# Patient Record
Sex: MALE | Race: WHITE | NOT HISPANIC OR LATINO | URBAN - METROPOLITAN AREA
[De-identification: names, ages, dates, MRNs, and addresses within clinical notes are randomized per-mention and may not be internally consistent; named-entity substitution may affect disease eponyms.]

---

## 2023-04-26 ENCOUNTER — TELEPHONE (OUTPATIENT)
Dept: PRIMARY CARE CLINIC | Facility: CLINIC | Age: 38
End: 2023-04-26

## 2023-04-26 ENCOUNTER — OFFICE VISIT (OUTPATIENT)
Dept: PRIMARY CARE CLINIC | Facility: CLINIC | Age: 38
End: 2023-04-26
Payer: COMMERCIAL

## 2023-04-26 VITALS
HEART RATE: 67 BPM | WEIGHT: 315 LBS | BODY MASS INDEX: 44.1 KG/M2 | DIASTOLIC BLOOD PRESSURE: 80 MMHG | HEIGHT: 71 IN | SYSTOLIC BLOOD PRESSURE: 132 MMHG | TEMPERATURE: 98 F

## 2023-04-26 DIAGNOSIS — G47.33 OSA (OBSTRUCTIVE SLEEP APNEA): ICD-10-CM

## 2023-04-26 DIAGNOSIS — I10 PRIMARY HYPERTENSION: ICD-10-CM

## 2023-04-26 DIAGNOSIS — E66.01 MORBID OBESITY WITH BMI OF 45.0-49.9, ADULT: ICD-10-CM

## 2023-04-26 DIAGNOSIS — Z76.89 ENCOUNTER TO ESTABLISH CARE: Primary | ICD-10-CM

## 2023-04-26 DIAGNOSIS — R53.83 FATIGUE, UNSPECIFIED TYPE: ICD-10-CM

## 2023-04-26 DIAGNOSIS — R79.89 LOW TESTOSTERONE: ICD-10-CM

## 2023-04-26 PROCEDURE — 1159F MED LIST DOCD IN RCRD: CPT | Mod: CPTII,S$GLB,, | Performed by: FAMILY MEDICINE

## 2023-04-26 PROCEDURE — 3008F BODY MASS INDEX DOCD: CPT | Mod: CPTII,S$GLB,, | Performed by: FAMILY MEDICINE

## 2023-04-26 PROCEDURE — 3075F PR MOST RECENT SYSTOLIC BLOOD PRESS GE 130-139MM HG: ICD-10-PCS | Mod: CPTII,S$GLB,, | Performed by: FAMILY MEDICINE

## 2023-04-26 PROCEDURE — 3075F SYST BP GE 130 - 139MM HG: CPT | Mod: CPTII,S$GLB,, | Performed by: FAMILY MEDICINE

## 2023-04-26 PROCEDURE — 99215 PR OFFICE/OUTPT VISIT, EST, LEVL V, 40-54 MIN: ICD-10-PCS | Mod: S$GLB,,, | Performed by: FAMILY MEDICINE

## 2023-04-26 PROCEDURE — 99215 OFFICE O/P EST HI 40 MIN: CPT | Mod: S$GLB,,, | Performed by: FAMILY MEDICINE

## 2023-04-26 PROCEDURE — 99999 PR PBB SHADOW E&M-NEW PATIENT-LVL IV: ICD-10-PCS | Mod: PBBFAC,,, | Performed by: FAMILY MEDICINE

## 2023-04-26 PROCEDURE — 3008F PR BODY MASS INDEX (BMI) DOCUMENTED: ICD-10-PCS | Mod: CPTII,S$GLB,, | Performed by: FAMILY MEDICINE

## 2023-04-26 PROCEDURE — 99999 PR PBB SHADOW E&M-NEW PATIENT-LVL IV: CPT | Mod: PBBFAC,,, | Performed by: FAMILY MEDICINE

## 2023-04-26 PROCEDURE — 3079F PR MOST RECENT DIASTOLIC BLOOD PRESSURE 80-89 MM HG: ICD-10-PCS | Mod: CPTII,S$GLB,, | Performed by: FAMILY MEDICINE

## 2023-04-26 PROCEDURE — 1159F PR MEDICATION LIST DOCUMENTED IN MEDICAL RECORD: ICD-10-PCS | Mod: CPTII,S$GLB,, | Performed by: FAMILY MEDICINE

## 2023-04-26 PROCEDURE — 3079F DIAST BP 80-89 MM HG: CPT | Mod: CPTII,S$GLB,, | Performed by: FAMILY MEDICINE

## 2023-04-26 RX ORDER — TRIAMTERENE AND HYDROCHLOROTHIAZIDE 37.5; 25 MG/1; MG/1
1 CAPSULE ORAL DAILY
Qty: 90 CAPSULE | Refills: 3 | Status: SHIPPED | OUTPATIENT
Start: 2023-04-26 | End: 2024-02-28 | Stop reason: SDUPTHER

## 2023-04-26 RX ORDER — SEMAGLUTIDE 0.25 MG/.5ML
0.25 INJECTION, SOLUTION SUBCUTANEOUS
Qty: 2 ML | Refills: 3 | Status: SHIPPED | OUTPATIENT
Start: 2023-04-26 | End: 2023-05-15 | Stop reason: SDUPTHER

## 2023-04-26 RX ORDER — TRIAMTERENE AND HYDROCHLOROTHIAZIDE 37.5; 25 MG/1; MG/1
1 CAPSULE ORAL
COMMUNITY
Start: 2023-01-28 | End: 2023-04-26 | Stop reason: SDUPTHER

## 2023-04-26 NOTE — PATIENT INSTRUCTIONS
Physically, everything looks good today.      Blood pressure, however, is a bit higher than we would like.  We will recheck it before you go.  We will also check it again when you come back to bring us copies of your lab work.  If it remains elevated at these rechecks, I will likely add on another medication to help bring it down.    Certainly, weight loss will also help with blood pressure, fatigue, all of your other symptoms.    Remember, weight gain/weight loss is truly a function of calories in versus calories out.    Continue to eat a healthy diet.  Be careful with portion sizes.  Includes lots of fresh fruits, vegetables, whole grains, lean proteins.  See info below.    Keep hydrated.  Be sure to drink at least 8-10, 8 oz, glasses of water every day.    Stay active.  Try to do some sort of physical activity every day.  Nothing outrageous, just try walking for 10-15 minutes each day.     We can definitely use a medicine like Wegovy (semaglutide) to help with weight loss.  I will send a prescription to the pharmacy today.  We will start with 0.25 mg weekly for the 1st 4-8 weeks.  If you are tolerating it well, we can increase the dose.  Touch base with me about six weeks after you fill the prescription so we can make adjustments.  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

## 2023-04-26 NOTE — PROGRESS NOTES
"    [facility]       [address] [place] [address]      Phone: [phone]      Fax: [phone]    Ashish Carrillo MD                Office Visit  04/26/2023        Subjective      HPI:  Reji Garcia is a 37 y.o. male presents today in clinic for "Establish Care  ."     37-year-old gentleman presents today to establish care, discuss weight loss, other issues.      Patient states that he really wants to lose weight.  Has struggled with it all his life.  Mother has even had gastric bypass.  Has tried to watch diet, reduce portion sizes.  Works shift work, so eating well as challenge.  Has done intermittent fasting.  He even went to see an endocrinologist last year to get his thyroid checked, screen for diabetes, etc..  Everything came back okay.  Interested in using medication to help with weight loss.      Does have hypertension.  Currently takes triamterene-HCTZ 37.5-25 mg daily.  Generally keeps his blood pressure under good control, but lately pressures have been running a bit higher than he would like.  His your specialist says that any blood pressure medicine is fine with him, but they do recommend he stay on a fluid pill to help the fluid in his inner ear.      States that he saw Cardiology, Dr. Hanna, last year for checkup, cardiac eval.  EKG was okay.  Did coronary calcium score, came back at 40.  Was not recommended that he start on cholesterol medicine at that time because his cholesterol numbers were okay.  Recommended he focus on diet, exercise, lifestyle.    He reports that he is tired all the time, frequently fatigued.  Has sleep apnea.  Has also had testosterone checked, the numbers run low.  Last couple of times levels were around 260, 320.  Not on replacement.    PMH: HTN.  Low T.  ELEAZAR.  PSH: Bilateral cholesteatoma.  Umbilical hernia.  Left knee.  Left shoulder.    F MH:  None reported   Allergies:  Codeine unknown reaction, was a kid.  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    T: Denies  A: Occasionally  D:  Denies    Exercise:  No regular exercise program, but active at work.      The following were updated and reviewed by myself in the chart: medications, past medical history, past surgical history, family history, social history, and allergies.     Medications:  Current Outpatient Medications on File Prior to Visit   Medication Sig Dispense Refill    [DISCONTINUED] triamterene-hydrochlorothiazide 37.5-25 mg (DYAZIDE) 37.5-25 mg per capsule Take 1 capsule by mouth.       No current facility-administered medications on file prior to visit.        PMHx:  Past Medical History:   Diagnosis Date    Hypertension     Sleep apnea, unspecified       There are no problems to display for this patient.       PSHx:  Past Surgical History:   Procedure Laterality Date    HERNIA REPAIR  2020    INNER EAR SURGERY      KNEE SURGERY      SHOULDER SURGERY          FHx:  History reviewed. No pertinent family history.     Social:  Social History     Socioeconomic History    Marital status: Single   Tobacco Use    Smoking status: Some Days     Types: Cigarettes    Smokeless tobacco: Current     Types: Chew        Allergies:  Review of patient's allergies indicates:   Allergen Reactions    Codeine Other (See Comments)     Unknown, reaction happened as a child        ROS:  Review of Systems   Constitutional:  Positive for fatigue. Negative for activity change, appetite change, chills and fever.   HENT:  Negative for congestion, postnasal drip, rhinorrhea, sore throat and trouble swallowing.    Respiratory:  Negative for cough and shortness of breath.    Cardiovascular:  Negative for chest pain and palpitations.   Gastrointestinal:  Negative for abdominal pain, constipation, diarrhea, nausea and vomiting.   Genitourinary:  Negative for difficulty urinating.   Musculoskeletal:  Negative for arthralgias and myalgias.   Skin:  Negative for color change and rash.  "  Neurological:  Negative for headaches.   All other systems reviewed and are negative.       Objective      BP (!) 140/90   Pulse 67   Temp 98.1 °F (36.7 °C)   Ht 5' 11" (1.803 m)   Wt (!) 156.2 kg (344 lb 5.7 oz)   BMI 48.03 kg/m²   Ht Readings from Last 3 Encounters:   04/26/23 5' 11" (1.803 m)     Wt Readings from Last 3 Encounters:   04/26/23 (!) 156.2 kg (344 lb 5.7 oz)       PHYSICAL EXAM:  Physical Exam  Vitals and nursing note reviewed.   Constitutional:       General: He is not in acute distress.     Appearance: Normal appearance.   HENT:      Head: Normocephalic and atraumatic.      Right Ear: Tympanic membrane, ear canal and external ear normal.      Left Ear: Tympanic membrane, ear canal and external ear normal.      Nose: Nose normal. No congestion or rhinorrhea.      Mouth/Throat:      Mouth: Mucous membranes are moist.      Pharynx: Oropharynx is clear. No oropharyngeal exudate or posterior oropharyngeal erythema.   Eyes:      Extraocular Movements: Extraocular movements intact.      Conjunctiva/sclera: Conjunctivae normal.      Pupils: Pupils are equal, round, and reactive to light.   Cardiovascular:      Rate and Rhythm: Normal rate and regular rhythm.   Pulmonary:      Effort: Pulmonary effort is normal.      Breath sounds: No wheezing, rhonchi or rales.   Musculoskeletal:         General: Normal range of motion.      Cervical back: Normal range of motion.   Lymphadenopathy:      Cervical: No cervical adenopathy.   Skin:     General: Skin is warm and dry.   Neurological:      General: No focal deficit present.      Mental Status: He is alert.            LABS / IMAGING:  No results found for this or any previous visit (from the past 4368 hour(s)).      Assessment    1. Encounter to establish care    2. Primary hypertension    3. ELEAZAR (obstructive sleep apnea)    4. Fatigue, unspecified type    5. Low testosterone    6. Morbid obesity with BMI of 45.0-49.9, adult          Plan    Reji was " seen today for establish care.    Diagnoses and all orders for this visit:    Encounter to establish care    Primary hypertension  -     triamterene-hydrochlorothiazide 37.5-25 mg (DYAZIDE) 37.5-25 mg per capsule; Take 1 capsule by mouth once daily.    ELEAZAR (obstructive sleep apnea)    Fatigue, unspecified type    Low testosterone    Morbid obesity with BMI of 45.0-49.9, adult  -     semaglutide, weight loss, (WEGOVY) 0.25 mg/0.5 mL PnIj; Inject 0.25 mg into the skin every 7 days.      Physically, looks okay today.      He had some screening blood work done in January through work.  Cholesterol numbers that he remembers all look fine.  He will bring copies of the results to us for our records.      Blood pressure higher than we would like.  Will recheck before he goes.  Will also recheck it in a couple of days when he brings his lab results.  If still elevated, will add Ace/Arb.      Okay to use we go V for weight loss.  Prescription sent to pharmacy today.  Will likely need to do prior authorization.  RTC six weeks after starting to check progress, adjust dose, etc..    FOLLOW-UP:  Follow up in about 6 weeks (around 6/7/2023) for recheck.    I spent a total of 45 minutes face to face and non-face to face on the date of this visit.This includes time preparing to see the patient (eg, review of tests, notes), obtaining and/or reviewing additional history from an independent historian and/or outside medical records, documenting clinical information in the electronic health record, independently interpreting results and/or communicating results to the patient/family/caregiver, or care coordinator.    Signed by:  Ashish Carrillo MD

## 2023-04-26 NOTE — TELEPHONE ENCOUNTER
----- Message from Charo Venegas sent at 4/26/2023  1:19 PM CDT -----  Contact: [phone]  Pt is calling in regards to his semaglutide, weight loss, (WEGOVY) 0.25 mg/0.5 mL PnIj. Pt stated that pharmacy is requesting more information for the medication. Please call pt back at [phone].        [address] [address] [place] [id number] [facility] [address]  Phone: [phone] Fax: [phone]       Thanks KB

## 2023-04-27 ENCOUNTER — PATIENT MESSAGE (OUTPATIENT)
Dept: PRIMARY CARE CLINIC | Facility: CLINIC | Age: 38
End: 2023-04-27
Payer: COMMERCIAL

## 2023-05-01 NOTE — TELEPHONE ENCOUNTER
"----- Message from Mari Ocasio sent at 4/28/2023  3:52 PM CDT -----  "Type:  Patient Call Back    Who Called:PT    What is the reqeust in detail:Pt spoke with pharmacy and was informed insurance is requesting prior authorization for semaglutide, weight loss, (WEGOVY) 0.25 mg/0.5. Please advise    Can the clinic reply by [facility]?no    Best Call Back Number:[phone]      Additional Information:            "

## 2023-05-15 DIAGNOSIS — E66.01 MORBID OBESITY WITH BMI OF 45.0-49.9, ADULT: ICD-10-CM

## 2023-05-15 RX ORDER — SEMAGLUTIDE 0.25 MG/.5ML
0.25 INJECTION, SOLUTION SUBCUTANEOUS
Qty: 2 ML | Refills: 3 | Status: SHIPPED | OUTPATIENT
Start: 2023-05-15 | End: 2023-05-22 | Stop reason: DRUGHIGH

## 2023-05-15 NOTE — TELEPHONE ENCOUNTER
Care Due:                  Date            Visit Type   Department     Provider  --------------------------------------------------------------------------------                                NP -                              PRIMARY      [facility] PRIMARY  Last Visit: 04-      CARE ([facility])   DANNIE Carrillo                              EP -                              PRIMARY      [facility] PRIMARY  Next Visit: 06-      CARE ([facility])   DANNIE Carrillo                                                            Last  Test          Frequency    Reason                     Performed    Due Date  --------------------------------------------------------------------------------    CMP.........  12 months..  triamterene-hydrochloroth  Not Found    Overdue                             iazide...................    HBA1C.......  6 months...  semaglutide,.............  Not Found    Overdue    [employer] Embedded Care Due Messages. Reference number: [id number].   5/15/2023 12:52:49 PM CDT

## 2023-05-15 NOTE — TELEPHONE ENCOUNTER
Refill Routing Note   Medication(s) are not appropriate for processing by [facility] for the following reason(s):      Required labs outdated  New or recently adjusted medication    [facility] action(s):  Defer Labs due            Appointments  past 12m or future 3m with PCP    Date Provider   Last Visit   4/26/2023 Ashish Carrillo MD   Next Visit   6/7/2023 Ashish Carrillo MD   ED visits in past 90 days: 0        Note composed:2:55 PM 05/15/2023

## 2023-05-18 ENCOUNTER — TELEPHONE (OUTPATIENT)
Dept: PRIMARY CARE CLINIC | Facility: CLINIC | Age: 38
End: 2023-05-18
Payer: COMMERCIAL

## 2023-05-18 NOTE — TELEPHONE ENCOUNTER
----- Message from Maru Vásquez sent at 5/18/2023 11:27 AM CDT -----  Contact: BETHANY FAUSTIN IS CALLING REGARDING semaglutide, REPORTS  0.25 WAS CALLED INSTEAD OF 0.5. PLEASE CALL IN TO LISTED PHARMACY, PLEASE CALL PT TO NOTIFY       .  [address] [address] [facility]  [address] [place] [place] [address]  Phone: [phone] Fax: [phone]

## 2023-05-22 ENCOUNTER — PATIENT MESSAGE (OUTPATIENT)
Dept: PRIMARY CARE CLINIC | Facility: CLINIC | Age: 38
End: 2023-05-22
Payer: COMMERCIAL

## 2023-05-22 DIAGNOSIS — E66.01 MORBID OBESITY WITH BMI OF 45.0-49.9, ADULT: Primary | ICD-10-CM

## 2023-05-22 RX ORDER — SEMAGLUTIDE 0.5 MG/.5ML
0.5 INJECTION, SOLUTION SUBCUTANEOUS
Qty: 2 ML | Refills: 3 | Status: SHIPPED | OUTPATIENT
Start: 2023-05-22 | End: 2023-06-07 | Stop reason: SDUPTHER

## 2023-06-07 ENCOUNTER — OFFICE VISIT (OUTPATIENT)
Dept: PRIMARY CARE CLINIC | Facility: CLINIC | Age: 38
End: 2023-06-07
Payer: COMMERCIAL

## 2023-06-07 ENCOUNTER — PATIENT MESSAGE (OUTPATIENT)
Dept: PRIMARY CARE CLINIC | Facility: CLINIC | Age: 38
End: 2023-06-07
Payer: COMMERCIAL

## 2023-06-07 ENCOUNTER — PATIENT MESSAGE (OUTPATIENT)
Dept: PRIMARY CARE CLINIC | Facility: CLINIC | Age: 38
End: 2023-06-07

## 2023-06-07 DIAGNOSIS — E66.01 MORBID OBESITY WITH BMI OF 45.0-49.9, ADULT: Primary | ICD-10-CM

## 2023-06-07 PROCEDURE — 1159F PR MEDICATION LIST DOCUMENTED IN MEDICAL RECORD: ICD-10-PCS | Mod: CPTII,95,, | Performed by: FAMILY MEDICINE

## 2023-06-07 PROCEDURE — 1160F PR REVIEW ALL MEDS BY PRESCRIBER/CLIN PHARMACIST DOCUMENTED: ICD-10-PCS | Mod: CPTII,95,, | Performed by: FAMILY MEDICINE

## 2023-06-07 PROCEDURE — 99214 OFFICE O/P EST MOD 30 MIN: CPT | Mod: 95,,, | Performed by: FAMILY MEDICINE

## 2023-06-07 PROCEDURE — 99214 PR OFFICE/OUTPT VISIT, EST, LEVL IV, 30-39 MIN: ICD-10-PCS | Mod: 95,,, | Performed by: FAMILY MEDICINE

## 2023-06-07 PROCEDURE — 1159F MED LIST DOCD IN RCRD: CPT | Mod: CPTII,95,, | Performed by: FAMILY MEDICINE

## 2023-06-07 PROCEDURE — 1160F RVW MEDS BY RX/DR IN RCRD: CPT | Mod: CPTII,95,, | Performed by: FAMILY MEDICINE

## 2023-06-07 RX ORDER — SEMAGLUTIDE 0.25 MG/.5ML
0.25 INJECTION, SOLUTION SUBCUTANEOUS
Qty: 6 ML | Refills: 3 | Status: SHIPPED | OUTPATIENT
Start: 2023-06-07 | End: 2024-02-28

## 2023-06-07 RX ORDER — SEMAGLUTIDE 0.5 MG/.5ML
0.5 INJECTION, SOLUTION SUBCUTANEOUS
Qty: 2 ML | Refills: 3 | Status: SHIPPED | OUTPATIENT
Start: 2023-06-07 | End: 2024-02-28

## 2023-06-07 NOTE — PROGRESS NOTES
"    [facility]       [address] [place] [address]      Phone: [phone]      Fax: [phone]    Ashish Carrillo MD                Video Visit  06/07/2023        Subjective      HPI:  Reji Garcia is a 37 y.o. male presents today via video for "No chief complaint on file.  ."     37-year-old gentleman presents today via video visit to discuss weight loss, medication.      Since last visit, he has been able to fill the prescription for Wegovy 0.25 milligrams weekly.  Has been taking it with no issues.  Doing well on it.  States he is down about 25-26 pounds.  In addition, he was able to cut out alcohol.  This is helping with his weight loss.  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  He is able to of watch diet a lot more closely.  He does state that he notices by the end of the week, craving start to come back, but he is managing them.    Also has hypertension.  Currently takes triamterene-HCTZ 37.5-25 mg daily.  Generally keeps his blood pressure under good control.  Hoping that weight loss will reduce his blood pressure and that eventually he can come off of this medication.    PMH: HTN.  Low T.  ELEAZAR.  PSH: Bilateral cholesteatoma.  Umbilical hernia.  Left knee.  Left shoulder.    F MH:  None reported   Allergies:  Codeine unknown reaction, was a kid.    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    T: Denies  A: Occasionally  D:  Denies    Exercise:  No regular exercise program, but active at work.      The following were updated and reviewed by myself in the chart: medications, past medical history, past surgical history, family history, social history, and allergies.     Medications:  Current Outpatient Medications on File Prior to Visit   Medication Sig Dispense Refill    triamterene-hydrochlorothiazide 37.5-25 mg (DYAZIDE) 37.5-25 mg per capsule Take 1 capsule by mouth once daily. 90 capsule 3 "    [DISCONTINUED] semaglutide, weight loss, (WEGOVY) 0.5 mg/0.5 mL PnIj Inject 0.5 mg into the skin every 7 days. 2 mL 3     No current facility-administered medications on file prior to visit.        PMHx:  Past Medical History:   Diagnosis Date    Hypertension     Sleep apnea, unspecified       There are no problems to display for this patient.       PSHx:  Past Surgical History:   Procedure Laterality Date    HERNIA REPAIR  2020    INNER EAR SURGERY      KNEE SURGERY      SHOULDER SURGERY          FHx:  History reviewed. No pertinent family history.     Social:  Social History     Socioeconomic History    Marital status: Single   Tobacco Use    Smoking status: Some Days     Types: Cigarettes    Smokeless tobacco: Current     Types: Chew        Allergies:  Review of patient's allergies indicates:   Allergen Reactions    Codeine Other (See Comments)     Unknown, reaction happened as a child        ROS:  Review of Systems   Constitutional:  Negative for activity change, appetite change, chills, fever and unexpected weight change.   HENT:  Negative for congestion, hearing loss, postnasal drip, rhinorrhea, sore throat and trouble swallowing.    Eyes:  Negative for discharge and visual disturbance.   Respiratory:  Negative for cough, chest tightness, shortness of breath and wheezing.    Cardiovascular:  Negative for chest pain and palpitations.   Gastrointestinal:  Negative for abdominal pain, blood in stool, constipation, diarrhea, nausea and vomiting.   Endocrine: Negative for polydipsia and polyuria.   Genitourinary:  Negative for difficulty urinating, hematuria and urgency.   Musculoskeletal:  Negative for arthralgias, joint swelling, myalgias and neck pain.   Skin:  Negative for color change and rash.   Neurological:  Negative for weakness and headaches.   Psychiatric/Behavioral:  Negative for confusion and dysphoric mood.    All other systems reviewed and are negative.       Objective      There were no vitals  "taken for this visit.  Ht Readings from Last 3 Encounters:   04/26/23 5' 11" (1.803 m)     Wt Readings from Last 3 Encounters:   04/26/23 (!) 156.2 kg (344 lb 5.7 oz)       PHYSICAL EXAM:  Physical Exam  Constitutional:       General: He is not in acute distress.     Appearance: Normal appearance. He is not ill-appearing.   HENT:      Head: Normocephalic and atraumatic.   Pulmonary:      Effort: Pulmonary effort is normal. No respiratory distress.   Neurological:      Mental Status: He is alert.   Psychiatric:         Mood and Affect: Mood normal.         Behavior: Behavior normal.         Thought Content: Thought content normal.            LABS / IMAGING:  No results found for this or any previous visit (from the past 4368 hour(s)).      Assessment    1. Morbid obesity with BMI of 45.0-49.9, adult          Plan    Diagnoses and all orders for this visit:    Morbid obesity with BMI of 45.0-49.9, adult  -     semaglutide, weight loss, (WEGOVY) 0.5 mg/0.5 mL PnIj; Inject 0.5 mg into the skin every 7 days.  -     semaglutide, weight loss, (WEGOVY) 0.25 mg/0.5 mL PnIj; Inject 0.25 mg into the skin every 7 days.      Physically, looks good on screen today.      Doing very well with semaglutide.  Will call the [facility] to see if they have the 0.5 milligram dose in stock.    Spoke with the pharmacist.  Unfortunately, everything is on back order except for the 2.4 milligram dose, which is too high for him right now.      Instead, will send two separate prescriptions for 0.25 milligram a and 0.5 milligram to [facility].  Looking online, it appears that they do have the 0.25 milligram dose in stock, but unsure of the 0.5 milligram dose.  He will check online tonight to see which one he can order.    FOLLOW-UP:  Follow up in about 3 months (around 9/7/2023) for recheck.    The patient location is: [place]  The chief complaint leading to consultation is: weight loss/meds    Visit type: audiovisual    Face to Face " time with patient: 12 minutes    35 minutes of total time spent on the encounter, which includes face to face time and non-face to face time preparing to see the patient (eg, review of tests), Obtaining and/or reviewing separately obtained history, Documenting clinical information in the electronic or other health record, Independently interpreting results (not separately reported) and communicating results to the patient/family/caregiver, or Care coordination (not separately reported).     Each patient to whom he or she provides medical services by telemedicine is:  (1) informed of the relationship between the physician and patient and the respective role of any other health care provider with respect to management of the patient; and (2) notified that he or she may decline to receive medical services by telemedicine and may withdraw from such care at any time.    Signed by:  Ashish Carrillo MD

## 2023-06-07 NOTE — PATIENT INSTRUCTIONS
I called the [facility] pharmacy and they do not have any semaglutide in stock.      Online, it looks like [employer] may have the 0.25 milligram dose, but I am not sure of the 0.5 milligram dose hard to tell on their website.      I am sending a prescription for both to the [facility] pharmacy.  Try to fill the 0.5 milligram dose 1st.  If they say it is out of stock, then try filling the 0.25 milligram dose.  Since you are doing well on it, we can just stay on that dose as long as we need to, or until the higher dose comes back in stock locally.

## 2023-06-09 ENCOUNTER — TELEPHONE (OUTPATIENT)
Dept: PRIMARY CARE CLINIC | Facility: CLINIC | Age: 38
End: 2023-06-09
Payer: COMMERCIAL

## 2023-06-09 NOTE — TELEPHONE ENCOUNTER
Spoke with [facility] pharmacy and they advised me that they are out of Wegovy and don't know when they are going to get anymore in.

## 2023-06-09 NOTE — TELEPHONE ENCOUNTER
----- Message from Rosamaria Henry sent at 6/9/2023 10:42 AM CDT -----  Yvonne with [facility] called regarding clarification for semaglutide, weight loss, (WEGOVY) 0.5 mg/0.5 mL PnIj.  Call back number is [phone]. Thx .EL

## 2023-06-18 ENCOUNTER — PATIENT MESSAGE (OUTPATIENT)
Dept: PRIMARY CARE CLINIC | Facility: CLINIC | Age: 38
End: 2023-06-18
Payer: COMMERCIAL

## 2023-06-19 ENCOUNTER — OFFICE VISIT (OUTPATIENT)
Dept: PRIMARY CARE CLINIC | Facility: CLINIC | Age: 38
End: 2023-06-19
Payer: COMMERCIAL

## 2023-06-19 ENCOUNTER — PATIENT MESSAGE (OUTPATIENT)
Dept: PRIMARY CARE CLINIC | Facility: CLINIC | Age: 38
End: 2023-06-19

## 2023-06-19 DIAGNOSIS — E66.01 MORBID OBESITY WITH BMI OF 45.0-49.9, ADULT: Primary | ICD-10-CM

## 2023-06-19 PROCEDURE — 99214 OFFICE O/P EST MOD 30 MIN: CPT | Mod: 95,,, | Performed by: FAMILY MEDICINE

## 2023-06-19 PROCEDURE — 1160F RVW MEDS BY RX/DR IN RCRD: CPT | Mod: CPTII,95,, | Performed by: FAMILY MEDICINE

## 2023-06-19 PROCEDURE — 1159F MED LIST DOCD IN RCRD: CPT | Mod: CPTII,95,, | Performed by: FAMILY MEDICINE

## 2023-06-19 PROCEDURE — 1159F PR MEDICATION LIST DOCUMENTED IN MEDICAL RECORD: ICD-10-PCS | Mod: CPTII,95,, | Performed by: FAMILY MEDICINE

## 2023-06-19 PROCEDURE — 99214 PR OFFICE/OUTPT VISIT, EST, LEVL IV, 30-39 MIN: ICD-10-PCS | Mod: 95,,, | Performed by: FAMILY MEDICINE

## 2023-06-19 PROCEDURE — 1160F PR REVIEW ALL MEDS BY PRESCRIBER/CLIN PHARMACIST DOCUMENTED: ICD-10-PCS | Mod: CPTII,95,, | Performed by: FAMILY MEDICINE

## 2023-06-19 RX ORDER — SEMAGLUTIDE 0.5 MG/.5ML
0.5 INJECTION, SOLUTION SUBCUTANEOUS
Qty: 2 ML | Refills: 12 | Status: SHIPPED | OUTPATIENT
Start: 2023-06-19 | End: 2024-02-28

## 2023-06-19 NOTE — PROGRESS NOTES
"    [facility]       [address] [place] [address]      Phone: [phone]      Fax: [phone]    Ashish Carrillo MD                Video Visit  06/19/2023        Subjective      HPI:  Reji Garcia is a 37 y.o. male presents today via video for "No chief complaint on file.  ."     37-year-old gentleman presents today via video visit to discuss weight loss, medication.      Has been taking Wegovy 0.25 mg weekly.  No issues, doing well.  Has gone from 344 lb to approximately 309 lb.  Feels like he is plateauing.  At previous visit, we attempted to send prescriptions for the 0.5 mg dose.  Initially, [facility] pharmacy told him it was in stock and that they were going to ship it.  About three days later, they cancel the order saying it was not in stock anymore.  [facility] currently out of the medication.  They did, however, have a small supply of 0.25 mg dose.  He was able to fill that so he did not miss any doses.  Wants to discuss alternatives in case he can not find the medication in stock locally.      PMH: HTN.  Low T.  ELEAZAR.  PSH: Bilateral cholesteatoma.  Umbilical hernia.  Left knee.  Left shoulder.    F MH:  None reported   Allergies:  Codeine unknown reaction, was a kid.    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    T: Denies  A: Occasionally  D:  Denies    Exercise:  No regular exercise program, but active at work.      The following were updated and reviewed by myself in the chart: medications, past medical history, past surgical history, family history, social history, and allergies.     Medications:  Current Outpatient Medications on File Prior to Visit   Medication Sig Dispense Refill    semaglutide, weight loss, (WEGOVY) 0.25 mg/0.5 mL PnIj Inject 0.25 mg into the skin every 7 days. 6 mL 3    semaglutide, weight loss, (WEGOVY) 0.5 mg/0.5 mL PnIj Inject 0.5 mg into the skin every 7 days. 2 mL 3    triamterene-hydrochlorothiazide 37.5-25 mg (DYAZIDE) " "37.5-25 mg per capsule Take 1 capsule by mouth once daily. 90 capsule 3     No current facility-administered medications on file prior to visit.        PMHx:  Past Medical History:   Diagnosis Date    Hypertension     Sleep apnea, unspecified       There are no problems to display for this patient.       PSHx:  Past Surgical History:   Procedure Laterality Date    HERNIA REPAIR  2020    INNER EAR SURGERY      KNEE SURGERY      SHOULDER SURGERY          FHx:  History reviewed. No pertinent family history.     Social:  Social History     Socioeconomic History    Marital status: Single   Tobacco Use    Smoking status: Some Days     Types: Cigarettes    Smokeless tobacco: Current     Types: Chew        Allergies:  Review of patient's allergies indicates:   Allergen Reactions    Codeine Other (See Comments)     Unknown, reaction happened as a child        ROS:  Review of Systems   Constitutional:  Negative for activity change, appetite change, chills and fever.   HENT:  Negative for congestion, postnasal drip, rhinorrhea, sore throat and trouble swallowing.    Respiratory:  Negative for cough and shortness of breath.    Cardiovascular:  Negative for chest pain and palpitations.   Gastrointestinal:  Negative for abdominal pain, constipation, diarrhea, nausea and vomiting.   Genitourinary:  Negative for difficulty urinating.   Musculoskeletal:  Negative for arthralgias and myalgias.   Skin:  Negative for color change and rash.   Neurological:  Negative for headaches.   All other systems reviewed and are negative.       Objective      There were no vitals taken for this visit.  Ht Readings from Last 3 Encounters:   04/26/23 5' 11" (1.803 m)     Wt Readings from Last 3 Encounters:   04/26/23 (!) 156.2 kg (344 lb 5.7 oz)       PHYSICAL EXAM:  Physical Exam  Constitutional:       Appearance: Normal appearance.   HENT:      Head: Normocephalic and atraumatic.   Pulmonary:      Effort: Pulmonary effort is normal. No respiratory " "distress.   Neurological:      Mental Status: He is alert.   Psychiatric:         Mood and Affect: Mood normal.         Behavior: Behavior normal.         Thought Content: Thought content normal.            LABS / IMAGING:  No results found for this or any previous visit (from the past 4368 hour(s)).      Assessment    1. Morbid obesity with BMI of 45.0-49.9, adult          Plan    Diagnoses and all orders for this visit:    Morbid obesity with BMI of 45.0-49.9, adult  -     semaglutide, weight loss, (WEGOVY) 0.5 mg/0.5 mL PnIj; Inject 0.5 mg into the skin every 7 days.  -     INV semaglutide/placebo injection; Semaglutide 5mg / Cyanocobalamin 0.5mg / 1 mL - Inject 10 units (0.1 mL) SQ every 7 days - Disp: 2.5 mL vial - Please disp with U-50 or U-100 syringes and 5/32" 32g needles.    One prescription is already on file at [facility].  Will send a prescription to [facility], as well, for the 0.5 mg dose.      Separately, will send prescription to [facility] to be filled in case other sources did not come through.      FOLLOW-UP:  Follow up in about 3 months (around 9/19/2023) for recheck.    The patient location is: [place]  The chief complaint leading to consultation is: med refill    Visit type: audiovisual    Face to Face time with patient: 11 minutes    35 minutes of total time spent on the encounter, which includes face to face time and non-face to face time preparing to see the patient (eg, review of tests), Obtaining and/or reviewing separately obtained history, Documenting clinical information in the electronic or other health record, Independently interpreting results (not separately reported) and communicating results to the patient/family/caregiver, or Care coordination (not separately reported).     Each patient to whom he or she provides medical services by telemedicine is:  (1) informed of the relationship between the physician and patient and the respective role of any other health care provider with respect " to management of the patient; and (2) notified that he or she may decline to receive medical services by telemedicine and may withdraw from such care at any time.    Signed by:  Ashish Carrillo MD

## 2023-06-19 NOTE — PATIENT INSTRUCTIONS
Hopefully [facility] will get the 0.5 mg dose of Wegovy back in stock soon.      If not, I did go ahead and send a new prescription to [email or web address] for the 0.5 mg dose.  Hopefully they will get this in stock soon too.    In the meantime, I sent a separate prescription for semaglutide to the compounding pharmacy ([facility]) in [place] that we discussed.  Please call them at [phone] to ensure that they receive the prescription, to give them address/shipping info, payment info, etc..     Their website is:  [email or web address]    When you fill the prescription, we can use 10 units (0.5 mg) weekly.  This would be equivalent to the 0.5 mg weekly dose of Wegovy.    We can stay at this dose as long as you tolerate it and weight loss continues.  Generally, I would like to give it at least four weeks.    If the other pharmacies are able to fill the actual brand name Wegovy, we can just store this medication in the freezer and use it, as needed.    Keep me posted on your progress.  Let us touch base in about two or three months to see how you are doing.

## 2023-08-16 DIAGNOSIS — I10 PRIMARY HYPERTENSION: ICD-10-CM

## 2023-10-26 ENCOUNTER — PATIENT MESSAGE (OUTPATIENT)
Dept: PRIMARY CARE CLINIC | Facility: CLINIC | Age: 38
End: 2023-10-26

## 2023-10-26 ENCOUNTER — OFFICE VISIT (OUTPATIENT)
Dept: PRIMARY CARE CLINIC | Facility: CLINIC | Age: 38
End: 2023-10-26
Payer: COMMERCIAL

## 2023-10-26 VITALS — WEIGHT: 297 LBS | BODY MASS INDEX: 41.58 KG/M2 | HEIGHT: 71 IN

## 2023-10-26 DIAGNOSIS — E34.8 HYPERESTROGENISM IN MALE: ICD-10-CM

## 2023-10-26 DIAGNOSIS — G47.33 OSA (OBSTRUCTIVE SLEEP APNEA): ICD-10-CM

## 2023-10-26 DIAGNOSIS — Z12.5 PROSTATE CANCER SCREENING: ICD-10-CM

## 2023-10-26 DIAGNOSIS — I10 PRIMARY HYPERTENSION: ICD-10-CM

## 2023-10-26 DIAGNOSIS — R79.89 LOW TESTOSTERONE: ICD-10-CM

## 2023-10-26 PROCEDURE — 1159F PR MEDICATION LIST DOCUMENTED IN MEDICAL RECORD: ICD-10-PCS | Mod: CPTII,95,, | Performed by: FAMILY MEDICINE

## 2023-10-26 PROCEDURE — 3008F PR BODY MASS INDEX (BMI) DOCUMENTED: ICD-10-PCS | Mod: CPTII,95,, | Performed by: FAMILY MEDICINE

## 2023-10-26 PROCEDURE — 99213 PR OFFICE/OUTPT VISIT, EST, LEVL III, 20-29 MIN: ICD-10-PCS | Mod: 95,,, | Performed by: FAMILY MEDICINE

## 2023-10-26 PROCEDURE — 99213 OFFICE O/P EST LOW 20 MIN: CPT | Mod: 95,,, | Performed by: FAMILY MEDICINE

## 2023-10-26 PROCEDURE — 1159F MED LIST DOCD IN RCRD: CPT | Mod: CPTII,95,, | Performed by: FAMILY MEDICINE

## 2023-10-26 PROCEDURE — 3008F BODY MASS INDEX DOCD: CPT | Mod: CPTII,95,, | Performed by: FAMILY MEDICINE

## 2023-10-26 NOTE — PROGRESS NOTES
[facility]       [address] [place] [address]      [phone]     Sujata Mathis MD         .      TELEMEDICINE VISIT  10/26/2023    Reji Garcia is a 38 y.o. male who presents for Telemed visit.   The patient location is: [place]    The chief complaint leading to consultation is:  Is wanting to establish care with me.  His daughter and wife sees me as a physician and just would like to transfer his care over as well.  Since May, be has been on we go we what until the shortages came through, he is now taking compounded semaglutide 1 mg and he is went from a maximum weight of 342 lb down to 297 lb.  Feels much better.  His recent labs done through his employer showed total cholesterol 187, triglycerides 145, HDL 44, and .  He does have a history of sleep apnea and is compliant with CPAP.  He also has tested low for testosterone with the lowest being 260, but was not started on testosterone replacement therapy.  He was hoping with weight loss, he would naps would be able to build up his testosterone.  He is also taking dim a DHEA to help with the estrogen and testosterone levels as well.  He is otherwise feeling good and has no specific complaints today.      REVIEW OF SYMPTOMS  All negative except as stated above.      PHYSICAL EXAM:  Alert and awake, Normal appearance, energetic, and overweight/obese  Normocephalic, Atraumatic , and Normal facies  EOMI intact and Clear conjunctivae    No cyanosis and No labored breathing  Normal Abdomen, No tenderness/guarding, and Increased Abdominal Girth      Normal mood, Normal affect, Normal behavior, Congruent sppech, Normal memory, and Normal speech      ASSESSMENT AND PLAN      1. Body mass index (BMI) of 40.0 to 44.9 in adult    2. Primary hypertension    3. Low testosterone    4. 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  Agree to make no changes to his current regimen as he is doing well with his weight loss goals and is feeling good.  Agree with him to continue taking dim and DHEA to help with the estrogen and testosterone levels.  We will plan to repeat labs in six months and he will continue to upload his labs from his employer as he gets them done.  We will follow up in six months.    I spent a total of 35 minutes face to face and non-face to face on the date of this visit.This includes time preparing to see the patient (eg, review of tests, notes), obtaining and/or reviewing additional history from an independent historian and/or outside medical records, documenting clinical information in the electronic health record, independently interpreting results and/or communicating results to the patient/family/caregiver, or care coordinator.    Disclaimer: Portions of this record may have been created with voice recognition software. Occasional wrong-word or "sound-a-like" substitutions may have occurred due to inherent limitations of voice recognition software. Read the chart carefully and recognize, using context, where substitutions have occurred."    Visit type: Telemedicine:audio and visual  Each patient to whom he or she provides medical services by telemedicine is:  (1) informed of the relationship between the physician and patient and the respective role of any other health care provider with respect to management of the patient; and (2) notified that he or she may decline to receive medical services by telemedicine and may withdraw from such care at any time.    Signed by:  Sujata Mathis MD         "

## 2024-02-15 ENCOUNTER — PATIENT MESSAGE (OUTPATIENT)
Dept: PRIMARY CARE CLINIC | Facility: CLINIC | Age: 39
End: 2024-02-15
Payer: COMMERCIAL

## 2024-02-28 ENCOUNTER — OFFICE VISIT (OUTPATIENT)
Dept: PRIMARY CARE CLINIC | Facility: CLINIC | Age: 39
End: 2024-02-28
Payer: COMMERCIAL

## 2024-02-28 ENCOUNTER — LAB VISIT (OUTPATIENT)
Dept: LAB | Facility: HOSPITAL | Age: 39
End: 2024-02-28
Attending: FAMILY MEDICINE
Payer: COMMERCIAL

## 2024-02-28 VITALS
HEIGHT: 71 IN | WEIGHT: 293 LBS | BODY MASS INDEX: 41.02 KG/M2 | SYSTOLIC BLOOD PRESSURE: 122 MMHG | HEART RATE: 71 BPM | DIASTOLIC BLOOD PRESSURE: 84 MMHG | TEMPERATURE: 98 F

## 2024-02-28 DIAGNOSIS — I10 PRIMARY HYPERTENSION: ICD-10-CM

## 2024-02-28 DIAGNOSIS — E66.01 CLASS 3 SEVERE OBESITY WITH SERIOUS COMORBIDITY AND BODY MASS INDEX (BMI) OF 40.0 TO 44.9 IN ADULT, UNSPECIFIED OBESITY TYPE: ICD-10-CM

## 2024-02-28 DIAGNOSIS — Z11.59 ENCOUNTER FOR HCV SCREENING TEST FOR LOW RISK PATIENT: ICD-10-CM

## 2024-02-28 DIAGNOSIS — Z12.5 ENCOUNTER FOR SCREENING FOR MALIGNANT NEOPLASM OF PROSTATE: ICD-10-CM

## 2024-02-28 DIAGNOSIS — Z11.4 ENCOUNTER FOR SCREENING FOR HIV: ICD-10-CM

## 2024-02-28 DIAGNOSIS — R53.83 FATIGUE, UNSPECIFIED TYPE: ICD-10-CM

## 2024-02-28 DIAGNOSIS — E34.8 HYPERESTROGENISM IN MALE: ICD-10-CM

## 2024-02-28 DIAGNOSIS — G47.33 OSA (OBSTRUCTIVE SLEEP APNEA): ICD-10-CM

## 2024-02-28 DIAGNOSIS — R53.83 FATIGUE, UNSPECIFIED TYPE: Primary | ICD-10-CM

## 2024-02-28 DIAGNOSIS — R79.89 LOW TESTOSTERONE: ICD-10-CM

## 2024-02-28 PROBLEM — E66.813 CLASS 3 SEVERE OBESITY WITH SERIOUS COMORBIDITY AND BODY MASS INDEX (BMI) OF 40.0 TO 44.9 IN ADULT, UNSPECIFIED OBESITY TYPE: Status: ACTIVE | Noted: 2024-02-28

## 2024-02-28 LAB
ALBUMIN SERPL BCP-MCNC: 4.9 G/DL (ref 3.5–5.2)
ALP SERPL-CCNC: 62 U/L (ref 55–135)
ALT SERPL W/O P-5'-P-CCNC: 29 U/L (ref 10–44)
ANION GAP SERPL CALC-SCNC: 11 MMOL/L (ref 8–16)
AST SERPL-CCNC: 23 U/L (ref 10–40)
BASOPHILS # BLD AUTO: 0.02 K/UL (ref 0–0.2)
BASOPHILS NFR BLD: 0.3 % (ref 0–1.9)
BILIRUB SERPL-MCNC: 0.8 MG/DL (ref 0.1–1)
BUN SERPL-MCNC: 14 MG/DL (ref 6–20)
CALCIUM SERPL-MCNC: 10.1 MG/DL (ref 8.7–10.5)
CHLORIDE SERPL-SCNC: 106 MMOL/L (ref 95–110)
CHOLEST SERPL-MCNC: 210 MG/DL (ref 120–199)
CHOLEST/HDLC SERPL: 4.8 {RATIO} (ref 2–5)
CO2 SERPL-SCNC: 22 MMOL/L (ref 23–29)
COMPLEXED PSA SERPL-MCNC: 0.73 NG/ML (ref 0–4)
CREAT SERPL-MCNC: 0.9 MG/DL (ref 0.5–1.4)
DIFFERENTIAL METHOD BLD: NORMAL
EOSINOPHIL # BLD AUTO: 0.1 K/UL (ref 0–0.5)
EOSINOPHIL NFR BLD: 1.2 % (ref 0–8)
ERYTHROCYTE [DISTWIDTH] IN BLOOD BY AUTOMATED COUNT: 13.2 % (ref 11.5–14.5)
EST. GFR  (NO RACE VARIABLE): >60 ML/MIN/1.73 M^2
ESTIMATED AVG GLUCOSE: 88 MG/DL (ref 68–131)
GLUCOSE SERPL-MCNC: 84 MG/DL (ref 70–110)
HBA1C MFR BLD: 4.7 % (ref 4–5.6)
HCT VFR BLD AUTO: 47.5 % (ref 40–54)
HCV AB SERPL QL IA: NORMAL
HDLC SERPL-MCNC: 44 MG/DL (ref 40–75)
HDLC SERPL: 21 % (ref 20–50)
HGB BLD-MCNC: 15.6 G/DL (ref 14–18)
HIV 1+2 AB+HIV1 P24 AG SERPL QL IA: NORMAL
IMM GRANULOCYTES # BLD AUTO: 0.02 K/UL (ref 0–0.04)
IMM GRANULOCYTES NFR BLD AUTO: 0.3 % (ref 0–0.5)
INSULIN COLLECTION INTERVAL: NORMAL
INSULIN SERPL-ACNC: 15 UU/ML
LDLC SERPL CALC-MCNC: 132.4 MG/DL (ref 63–159)
LYMPHOCYTES # BLD AUTO: 1.4 K/UL (ref 1–4.8)
LYMPHOCYTES NFR BLD: 21.6 % (ref 18–48)
MCH RBC QN AUTO: 29.4 PG (ref 27–31)
MCHC RBC AUTO-ENTMCNC: 32.8 G/DL (ref 32–36)
MCV RBC AUTO: 90 FL (ref 82–98)
MONOCYTES # BLD AUTO: 0.5 K/UL (ref 0.3–1)
MONOCYTES NFR BLD: 6.7 % (ref 4–15)
NEUTROPHILS # BLD AUTO: 4.7 K/UL (ref 1.8–7.7)
NEUTROPHILS NFR BLD: 69.9 % (ref 38–73)
NONHDLC SERPL-MCNC: 166 MG/DL
NRBC BLD-RTO: 0 /100 WBC
PLATELET # BLD AUTO: 199 K/UL (ref 150–450)
PMV BLD AUTO: 10.8 FL (ref 9.2–12.9)
POTASSIUM SERPL-SCNC: 4.8 MMOL/L (ref 3.5–5.1)
PROT SERPL-MCNC: 8.3 G/DL (ref 6–8.4)
RBC # BLD AUTO: 5.3 M/UL (ref 4.6–6.2)
SODIUM SERPL-SCNC: 139 MMOL/L (ref 136–145)
TESTOST SERPL-MCNC: 385 NG/DL (ref 304–1227)
TRIGL SERPL-MCNC: 168 MG/DL (ref 30–150)
TSH SERPL DL<=0.005 MIU/L-ACNC: 3.6 UIU/ML (ref 0.4–4)
WBC # BLD AUTO: 6.68 K/UL (ref 3.9–12.7)

## 2024-02-28 PROCEDURE — 83525 ASSAY OF INSULIN: CPT | Performed by: FAMILY MEDICINE

## 2024-02-28 PROCEDURE — 84153 ASSAY OF PSA TOTAL: CPT | Performed by: FAMILY MEDICINE

## 2024-02-28 PROCEDURE — 82672 ASSAY OF ESTROGEN: CPT | Performed by: FAMILY MEDICINE

## 2024-02-28 PROCEDURE — 99215 OFFICE O/P EST HI 40 MIN: CPT | Mod: S$GLB,,, | Performed by: FAMILY MEDICINE

## 2024-02-28 PROCEDURE — 86803 HEPATITIS C AB TEST: CPT | Performed by: FAMILY MEDICINE

## 2024-02-28 PROCEDURE — 1159F MED LIST DOCD IN RCRD: CPT | Mod: CPTII,S$GLB,, | Performed by: FAMILY MEDICINE

## 2024-02-28 PROCEDURE — 36415 COLL VENOUS BLD VENIPUNCTURE: CPT | Mod: PN | Performed by: FAMILY MEDICINE

## 2024-02-28 PROCEDURE — G2211 COMPLEX E/M VISIT ADD ON: HCPCS | Mod: S$GLB,,, | Performed by: FAMILY MEDICINE

## 2024-02-28 PROCEDURE — 80053 COMPREHEN METABOLIC PANEL: CPT | Performed by: FAMILY MEDICINE

## 2024-02-28 PROCEDURE — 84403 ASSAY OF TOTAL TESTOSTERONE: CPT | Performed by: FAMILY MEDICINE

## 2024-02-28 PROCEDURE — 3008F BODY MASS INDEX DOCD: CPT | Mod: CPTII,S$GLB,, | Performed by: FAMILY MEDICINE

## 2024-02-28 PROCEDURE — 3074F SYST BP LT 130 MM HG: CPT | Mod: CPTII,S$GLB,, | Performed by: FAMILY MEDICINE

## 2024-02-28 PROCEDURE — 99999 PR PBB SHADOW E&M-EST. PATIENT-LVL IV: CPT | Mod: PBBFAC,,, | Performed by: FAMILY MEDICINE

## 2024-02-28 PROCEDURE — 87389 HIV-1 AG W/HIV-1&-2 AB AG IA: CPT | Performed by: FAMILY MEDICINE

## 2024-02-28 PROCEDURE — 84443 ASSAY THYROID STIM HORMONE: CPT | Performed by: FAMILY MEDICINE

## 2024-02-28 PROCEDURE — 83036 HEMOGLOBIN GLYCOSYLATED A1C: CPT | Performed by: FAMILY MEDICINE

## 2024-02-28 PROCEDURE — 85025 COMPLETE CBC W/AUTO DIFF WBC: CPT | Performed by: FAMILY MEDICINE

## 2024-02-28 PROCEDURE — 84403 ASSAY OF TOTAL TESTOSTERONE: CPT | Mod: 91 | Performed by: FAMILY MEDICINE

## 2024-02-28 PROCEDURE — 80061 LIPID PANEL: CPT | Performed by: FAMILY MEDICINE

## 2024-02-28 PROCEDURE — 3079F DIAST BP 80-89 MM HG: CPT | Mod: CPTII,S$GLB,, | Performed by: FAMILY MEDICINE

## 2024-02-28 RX ORDER — TRIAMTERENE AND HYDROCHLOROTHIAZIDE 37.5; 25 MG/1; MG/1
1 CAPSULE ORAL DAILY
Qty: 90 CAPSULE | Refills: 3 | Status: SHIPPED | OUTPATIENT
Start: 2024-02-28

## 2024-02-28 RX ORDER — SEMAGLUTIDE 1.7 MG/.75ML
1.7 INJECTION, SOLUTION SUBCUTANEOUS
Qty: 3 ML | Refills: 12 | Status: SHIPPED | OUTPATIENT
Start: 2024-02-28 | End: 2024-05-13 | Stop reason: DRUGHIGH

## 2024-02-28 NOTE — PATIENT INSTRUCTIONS
Physically, everything looks really good today.      Let us get some screening blood work done today.  We will check testosterone levels, blood counts, electrolytes, kidney function, liver function, thyroid function, and some other screening tests.  Results will be posted to [facility] as soon as they are available.      If testosterone levels are low, we will certainly get you over to Urology to discuss replacement options.    Congratulations on the weight loss! You are doing quite well.    Since you are already taking about 1.5 mg of semaglutide each week, I am going to send a new prescription for Wegovy 1.7mg to your pharmacy.  These higher doses tend to be more readily available than the lower doses.  Hopefully we can switch you over to the branded medication.    If not available, or if not covered by your insurance, we can continue using semaglutide from [facility].  I will also send refills to them so you do not run out.      Continue to eat a healthy diet.  Be careful with portion sizes.  Includes lots of fresh fruits, vegetables, whole grains, lean proteins.  See info below.    Keep hydrated.  Be sure to drink at least 8-10, 8 oz, glasses of water every day.    Stay active.  Try to do some sort of physical activity every day.  Nothing outrageous, just try walking for 10-15 minutes each day.

## 2024-02-28 NOTE — PROGRESS NOTES
"    [facility]       [address] [place] [address]      Phone: [phone]      Fax: [phone]    Ashish Carrillo MD                Office Visit  02/28/2024        Subjective      HPI:  Reji Garcia is a 38 y.o. male presents today in clinic for "Annual Exam  ."     38-year-old gentleman presents today to discuss multiple issues.      Overall, feels okay.  No chest pain, shortness on breath.  No fever, chills, body aches.  No coughing, sneezing, URI type symptoms.  Appetite normal.  Bowel movements normal.  No urinary issues.      States that he is tired, fatigued, all the time.  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  Saw endocrinology a couple years ago and testosterone levels were low.  Never really started on replacement.  Instead, just focused on diet, exercise, weight loss.  Thought that by losing weight he might increase his energy levels.  Has not really seen a significant difference even though he is recently lost about 50 lb.  Would like to get his testosterone levels rechecked.  Interested in replacement therapy if they remain low.      As above, has lost about 50 lb.  Has been taking semag[facility]ide from [facility].  Started with 5 units (0.25 mg) weekly.  There, slowly titrated up.  Currently taking 30 units (1.5 mg) weekly.  Has only been on this dose since the beginning of February.  As above, doing very well on it.  Only gets a little nausea when he increases the dose, otherwise tolerates it well.  Curbs appetite, helps with cravings.  Apparently, in the past, his insurance has said they will cover Wegovy, but he could never find it available due to the shortage.  That is why he was using semaglutide from [facility].    PMH: HTN.  Low T.  ELEAZAR.  PSH: Bilateral cholesteatoma.  Umbilical hernia.  Left knee.  Left shoulder.    F MH:  None reported   Allergies:  Codeine unknown reaction, was a kid.    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  " "  T: Denies  A: Occasionally  D:  Denies    Exercise:  No regular exercise program, but active at work.        The following were updated and reviewed by myself in the chart: medications, past medical history, past surgical history, family history, social history, and allergies.     Medications:  Current Outpatient Medications on File Prior to Visit   Medication Sig Dispense Refill    [DISCONTINUED] triamterene-hydrochlorothiazide 37.5-25 mg (DYAZIDE) 37.5-25 mg per capsule Take 1 capsule by mouth once daily. 90 capsule 3    [DISCONTINUED] INV semaglutide/placebo injection Semaglutide 5mg / Cyanocobalamin 0.5mg / 1 mL - Inject 10 units (0.1 mL) SQ every 7 days - Disp: 2.5 mL vial - Please disp with U-50 or U-100 syringes and 5/32" 32g needles. 2.5 mL 3    [DISCONTINUED] semaglutide, weight loss, (WEGOVY) 0.25 mg/0.5 mL PnIj Inject 0.25 mg into the skin every 7 days. (Patient not taking: Reported on 10/26/2023) 6 mL 3    [DISCONTINUED] semaglutide, weight loss, (WEGOVY) 0.5 mg/0.5 mL PnIj Inject 0.5 mg into the skin every 7 days. (Patient not taking: Reported on 10/26/2023) 2 mL 3    [DISCONTINUED] semaglutide, weight loss, (WEGOVY) 0.5 mg/0.5 mL PnIj Inject 0.5 mg into the skin every 7 days. (Patient not taking: Reported on 10/26/2023) 2 mL 12     No current facility-administered medications on file prior to visit.        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 No pertinent family history.     Social:  Social History     Socioeconomic History    Marital status: Single   Tobacco Use    Smoking status: Former     Types: Cigarettes    Smokeless tobacco: Current     Types: Chew   Substance and Sexual Activity    Drug use: Yes        Allergies:  Review of patient's allergies indicates:   Allergen Reactions    Codeine Other (See Comments)     Unknown, reaction happened as a child        ROS:  Review of Systems   Constitutional:  Positive for fatigue. Negative for activity change and unexpected weight change.   HENT:  Negative for hearing loss, rhinorrhea and trouble swallowing.    Eyes:  Negative for discharge and visual disturbance.   Respiratory:  Negative for chest tightness and wheezing.    Cardiovascular:  Negative for chest pain and palpitations.   Gastrointestinal:  Negative for blood in stool, constipation, diarrhea and vomiting.   Endocrine: Negative for polydipsia and polyuria.   Genitourinary:  Negative for difficulty urinating, hematuria and urgency.   Musculoskeletal:  Negative for arthralgias, joint swelling and neck pain.   Neurological:  Negative for weakness and headaches.   Psychiatric/Behavioral:  Negative for confusion and dysphoric mood.           Objective      /84   Pulse 71   Temp 97.9 °F (36.6 °C)   Ht 5' 11" (1.803 m)   Wt 132.9 kg (292 lb 15.9 oz)   BMI 40.86 kg/m²   Ht Readings from Last 3 Encounters:   02/28/24 5' 11" (1.803 m)   10/26/23 5' 11" (1.803 m)   04/26/23 5' 11" (1.803 m)     Wt Readings from Last 3 Encounters:   02/28/24 132.9 kg (292 lb 15.9 oz)   10/26/23 134.7 kg (297 lb)   04/26/23 (!) 156.2 kg (344 lb 5.7 oz)       PHYSICAL EXAM:  Physical Exam  Vitals and nursing note reviewed.   Constitutional:       General: He is not in acute distress.     Appearance: Normal appearance.   HENT:      Head: Normocephalic and atraumatic.      Right Ear: " Tympanic membrane, ear canal and external ear normal.      Left Ear: Tympanic membrane, ear canal and external ear normal.      Nose: Nose normal. No congestion or rhinorrhea.      Mouth/Throat:      Mouth: Mucous membranes are moist.      Pharynx: Oropharynx is clear. No oropharyngeal exudate or posterior oropharyngeal erythema.   Eyes:      Extraocular Movements: Extraocular movements intact.      Conjunctiva/sclera: Conjunctivae normal.      Pupils: Pupils are equal, round, and reactive to light.   Cardiovascular:      Rate and Rhythm: Normal rate and regular rhythm.   Pulmonary:      Effort: Pulmonary effort is normal.      Breath sounds: No wheezing, rhonchi or rales.   Musculoskeletal:         General: Normal range of motion.      Cervical back: Normal range of motion.   Lymphadenopathy:      Cervical: No cervical adenopathy.   Skin:     General: Skin is warm and dry.   Neurological:      General: No focal deficit present.      Mental Status: He is alert.              LABS / IMAGING:  No results found for this or any previous visit (from the past 4368 hour(s)).      Assessment    1. Fatigue, unspecified type    2. Primary hypertension    3. Class 3 severe obesity with serious comorbidity and body mass index (BMI) of 40.0 to 44.9 in adult, unspecified obesity type    4. Encounter for screening for malignant neoplasm of prostate    5. Encounter for HCV screening test for low risk patient    6. Encounter for screening for HIV          Plan    Reji was seen today for annual exam.    Diagnoses and all orders for this visit:    Fatigue, unspecified type  -     CBC Auto Differential; Future  -     Comprehensive Metabolic Panel; Future  -     TSH; Future  -     Hemoglobin A1C; Future  -     Testosterone; Future    Primary hypertension  -     triamterene-hydrochlorothiazide 37.5-25 mg (DYAZIDE) 37.5-25 mg per capsule; Take 1 capsule by mouth once daily.  -     CBC Auto Differential; Future  -     Comprehensive  Metabolic Panel; Future  -     TSH; Future  -     Lipid Panel; Future    Class 3 severe obesity with serious comorbidity and body mass index (BMI) of 40.0 to 44.9 in adult, unspecified obesity type  -     CBC Auto Differential; Future  -     Comprehensive Metabolic Panel; Future  -     TSH; Future  -     Lipid Panel; Future  -     Hemoglobin A1C; Future  -     semaglutide, weight loss, (WEGOVY) 1.7 mg/0.75 mL PnIj; Inject 1.7 mg into the skin every 7 days.    Encounter for screening for malignant neoplasm of prostate  -     PSA, Screening; Future    Encounter for HCV screening test for low risk patient  -     Hepatitis C Antibody; Future    Encounter for screening for HIV  -     HIV 1/2 Ag/Ab (4th Gen); Future    Physically, everything looks really good today.      Orders placed for some screening blood work, including testosterone levels.  He will get that done today.  He is fasting today.      If testosterone levels come back low, we will refer him to Urology to discuss replacement options.      Doing well with weight loss, semaglutide.  The 1.7 mg dose of Wegovy should be easier to get.  Typically the supplies shortages do not affect the 1.7 mg and 2.4 mg doses.  We will send new prescription to his pharmacy to see if it is available.  If not, he can continue using [facility]ide from [facility].  We will send refills there, as well, just in case.      FOLLOW-UP:  Follow up in about 3 months (around 5/28/2024) for recheck.    I spent a total of 45 minutes face to face and non-face to face on the date of this visit.This includes time preparing to see the patient (eg, review of tests, notes), obtaining and/or reviewing additional history from an independent historian and/or outside medical records, documenting clinical information in the electronic health record, independently interpreting results and/or communicating results to the patient/family/caregiver, or care coordinator.  Visit today included increased complexity  associated with the care of the episodic problem addressed and managing the longitudinal care of the patient due to the serious and/or complex managed problem(s).    Signed by:  Ashish Carrillo MD

## 2024-02-29 DIAGNOSIS — R53.83 FATIGUE, UNSPECIFIED TYPE: Primary | ICD-10-CM

## 2024-02-29 DIAGNOSIS — R79.89 LOW TESTOSTERONE: ICD-10-CM

## 2024-02-29 NOTE — PROGRESS NOTES
Overall, blood work looks pretty good.      Blood counts were all normal.  Electrolytes, kidney function, liver function, and thyroid function were all normal.  Your PSA level was in the normal range, so no signs of prostate issues.  You were negative for hepatitis-C and HIV.  A1c was also normal, so no signs of diabetes.    Total cholesterol and triglycerides were a tiny bit elevated.  As we continue to work on diet, exercise, weight loss, these numbers should improve significantly.    Interestingly testosterone levels were in the normal range, but at the very low end.  I am not opposed to having you see Urology to discuss whether or not testosterone treatment would be beneficial.  I am going to place a referral today.

## 2024-03-04 ENCOUNTER — PATIENT MESSAGE (OUTPATIENT)
Dept: PRIMARY CARE CLINIC | Facility: CLINIC | Age: 39
End: 2024-03-04
Payer: COMMERCIAL

## 2024-03-04 DIAGNOSIS — E66.01 CLASS 3 SEVERE OBESITY WITH SERIOUS COMORBIDITY AND BODY MASS INDEX (BMI) OF 40.0 TO 44.9 IN ADULT, UNSPECIFIED OBESITY TYPE: Primary | ICD-10-CM

## 2024-03-04 LAB — ESTROGEN SERPL-MCNC: 98 PG/ML

## 2024-03-13 LAB
TESTOST FREE SERPL-MCNC: 64.1 PG/ML (ref 35–155)
TESTOST SERPL-MCNC: 400 NG/DL (ref 250–1100)

## 2024-05-13 RX ORDER — SEMAGLUTIDE 2.4 MG/.75ML
2.4 INJECTION, SOLUTION SUBCUTANEOUS
Qty: 3 ML | Refills: 12 | Status: SHIPPED | OUTPATIENT
Start: 2024-05-13

## 2024-05-28 ENCOUNTER — OFFICE VISIT (OUTPATIENT)
Dept: PRIMARY CARE CLINIC | Facility: CLINIC | Age: 39
End: 2024-05-28
Payer: COMMERCIAL

## 2024-05-28 DIAGNOSIS — R79.89 LOW TESTOSTERONE: ICD-10-CM

## 2024-05-28 DIAGNOSIS — E66.01 CLASS 3 SEVERE OBESITY WITH SERIOUS COMORBIDITY AND BODY MASS INDEX (BMI) OF 40.0 TO 44.9 IN ADULT, UNSPECIFIED OBESITY TYPE: Primary | ICD-10-CM

## 2024-05-28 DIAGNOSIS — I10 PRIMARY HYPERTENSION: ICD-10-CM

## 2024-05-28 DIAGNOSIS — R53.83 FATIGUE, UNSPECIFIED TYPE: ICD-10-CM

## 2024-05-28 PROCEDURE — 99214 OFFICE O/P EST MOD 30 MIN: CPT | Mod: 95,,, | Performed by: FAMILY MEDICINE

## 2024-05-28 PROCEDURE — G2211 COMPLEX E/M VISIT ADD ON: HCPCS | Mod: 95,,, | Performed by: FAMILY MEDICINE

## 2024-05-28 PROCEDURE — 3044F HG A1C LEVEL LT 7.0%: CPT | Mod: CPTII,95,, | Performed by: FAMILY MEDICINE

## 2024-05-28 NOTE — PROGRESS NOTES
"    [facility]       [address] [place] [address]      Phone: [phone]      Fax: [phone]    Ashish Carrillo MD                Video Visit  05/28/2024        Subjective      HPI:  Reji Garcia is a 38 y.o. male presents today via video for "No chief complaint on file.  ."     38-year-old gentleman presents today via video visit to discuss multiple issues.      Overall, feels okay.  No chest pain, shortness on breath.  No fever, chills, body aches.  No coughing, sneezing, URI type symptoms.  Appetite normal.  Bowel movements normal.  No urinary issues.      Still working on weight loss.  When he started, he was around 344 lb.  Has been able to get down to 279 lb, but has gained a few lb back.  States this morning he weighs 285 lb at home.  Insurance covered Wegovy, but was unable to get it at the pharmacy initially.  Instead, started with semaglutide from [facility].  Titrated up until he was taking 30 units (1.5 mg) weekly.  Did well on it.  Was able to fill prescription for Wegovy 1.7 mg instead.  Took that for a couple of months.  A few weeks ago, we tried sending a new prescription for Wegovy 2.4 mg.  He was able to fill that, but has not started it yet.  States he still has five weeks' worth of the 1.7 mg dose in his Fridge.  Does notice that the 1.7 mg dose does not seem to be curbing cravings as much.  Also, does not last the entire seven days.  By day five or six he is wanting to eat again.    Still feels tired, fatigued.  Not as bad since he lost some weight, but still noticeable.  Uses CPAP nightly with no issue.  A few years ago, his testosterone levels were low.  He saw endocrinology who recommended weight loss.  As he has lost weight, his numbers have improved, but they are still at the low end of normal.    Also has hypertension.  Takes triamterene-HCTZ.  Blood pressure at home has been ranging 116-122/76-82.  Prior to the weight loss, it was " much higher.    PMH: HTN.  Low T.  ELEAZAR.  PSH: Bilateral cholesteatoma.  Umbilical hernia.  Left knee.  Left shoulder.    F MH:  None reported   Allergies:  Codeine unknown reaction, was a kid.    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    T: Denies  A: Occasionally  D:  Denies    Exercise:  No regular exercise program, but active at work.        The following were updated and reviewed by myself in the chart: medications, past medical history, past surgical history, family history, social history, and allergies.     Medications:  Current Outpatient Medications on File Prior to Visit   Medication Sig Dispense Refill    INV semaglutide/placebo injection Semaglutide 5mg / Cyanocobalamin 0.5mg / 1 mL - Inject 35 units (0.35 mL) SQ every 7 days - Disp: 2.5 mL vial 2.5 mL 12    semaglutide, weight loss, (WEGOVY) 2.4 mg/0.75 mL PnIj Inject 2.4 mg into the skin every 7 days. 3 mL 12    triamterene-hydrochlorothiazide 37.5-25 mg (DYAZIDE) 37.5-25 mg per capsule Take 1 capsule by mouth once daily. 90 capsule 3     No current facility-administered medications on file prior to visit.        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     Social:  Social History     Socioeconomic History    Marital status: Single   Tobacco Use    Smoking status: Former     Types: Cigarettes    Smokeless tobacco:  "Current     Types: Chew   Substance and Sexual Activity    Drug use: Yes        Allergies:  Review of patient's allergies indicates:   Allergen Reactions    Codeine Other (See Comments)     Unknown, reaction happened as a child        ROS:  Review of Systems   Constitutional:  Positive for fatigue. Negative for activity change, appetite change, chills, fever and unexpected weight change.   HENT:  Negative for congestion, hearing loss, postnasal drip, rhinorrhea, sore throat and trouble swallowing.    Eyes:  Negative for discharge and visual disturbance.   Respiratory:  Negative for cough, chest tightness, shortness of breath and wheezing.    Cardiovascular:  Negative for chest pain and palpitations.   Gastrointestinal:  Negative for abdominal pain, blood in stool, constipation, diarrhea, nausea and vomiting.   Endocrine: Negative for polydipsia and polyuria.   Genitourinary:  Negative for difficulty urinating, hematuria and urgency.   Musculoskeletal:  Negative for arthralgias, joint swelling, myalgias and neck pain.   Skin:  Negative for color change and rash.   Neurological:  Negative for weakness and headaches.   Psychiatric/Behavioral:  Negative for confusion and dysphoric mood.    All other systems reviewed and are negative.         Objective      There were no vitals taken for this visit.  Ht Readings from Last 3 Encounters:   02/28/24 5' 11" (1.803 m)   10/26/23 5' 11" (1.803 m)   04/26/23 5' 11" (1.803 m)     Wt Readings from Last 3 Encounters:   02/28/24 132.9 kg (292 lb 15.9 oz)   10/26/23 134.7 kg (297 lb)   04/26/23 (!) 156.2 kg (344 lb 5.7 oz)       PHYSICAL EXAM:  Physical Exam  Constitutional:       General: He is not in acute distress.     Appearance: Normal appearance. He is not ill-appearing.   HENT:      Head: Normocephalic and atraumatic.   Pulmonary:      Effort: Pulmonary effort is normal. No respiratory distress.   Neurological:      Mental Status: He is alert.   Psychiatric:         Mood and " Affect: Mood normal.         Behavior: Behavior normal.         Thought Content: Thought content normal.              LABS / IMAGING:  Recent Results (from the past 4368 hour(s))   TESTOSTERONE, FREE (DIALYSIS) AND TOTAL, LC/MS/MS    Collection Time: 02/28/24 12:25 PM   Result Value Ref Range    Testosterone, Total, LC/ 250 - 1100 ng/dL    Testosterone, Free by Dialysis 64.1 35.0 - 155.0 pg/mL   Insulin, Random    Collection Time: 02/28/24  1:25 PM   Result Value Ref Range    Insulin 15.0 <25.0 uU/mL    Insulin Collection Interval random    Estrogens, Total    Collection Time: 02/28/24  1:25 PM   Result Value Ref Range    Estrogen 98 pg/mL   CBC Auto Differential    Collection Time: 02/28/24  1:25 PM   Result Value Ref Range    WBC 6.68 3.90 - 12.70 K/uL    RBC 5.30 4.60 - 6.20 M/uL    Hemoglobin 15.6 14.0 - 18.0 g/dL    Hematocrit 47.5 40.0 - 54.0 %    MCV 90 82 - 98 fL    MCH 29.4 27.0 - 31.0 pg    MCHC 32.8 32.0 - 36.0 g/dL    RDW 13.2 11.5 - 14.5 %    Platelets 199 150 - 450 K/uL    MPV 10.8 9.2 - 12.9 fL    Immature Granulocytes 0.3 0.0 - 0.5 %    Gran # (ANC) 4.7 1.8 - 7.7 K/uL    Immature Grans (Abs) 0.02 0.00 - 0.04 K/uL    Lymph # 1.4 1.0 - 4.8 K/uL    Mono # 0.5 0.3 - 1.0 K/uL    Eos # 0.1 0.0 - 0.5 K/uL    Baso # 0.02 0.00 - 0.20 K/uL    nRBC 0 0 /100 WBC    Gran % 69.9 38.0 - 73.0 %    Lymph % 21.6 18.0 - 48.0 %    Mono % 6.7 4.0 - 15.0 %    Eosinophil % 1.2 0.0 - 8.0 %    Basophil % 0.3 0.0 - 1.9 %    Differential Method Automated    Comprehensive Metabolic Panel    Collection Time: 02/28/24  1:25 PM   Result Value Ref Range    Sodium 139 136 - 145 mmol/L    Potassium 4.8 3.5 - 5.1 mmol/L    Chloride 106 95 - 110 mmol/L    CO2 22 (L) 23 - 29 mmol/L    Glucose 84 70 - 110 mg/dL    BUN 14 6 - 20 mg/dL    Creatinine 0.9 0.5 - 1.4 mg/dL    Calcium 10.1 8.7 - 10.5 mg/dL    Total Protein 8.3 6.0 - 8.4 g/dL    Albumin 4.9 3.5 - 5.2 g/dL    Total Bilirubin 0.8 0.1 - 1.0 mg/dL    Alkaline Phosphatase 62 55  - 135 U/L    AST 23 10 - 40 U/L    ALT 29 10 - 44 U/L    eGFR >60.0 >60 mL/min/1.73 m^2    Anion Gap 11 8 - 16 mmol/L   TSH    Collection Time: 02/28/24  1:25 PM   Result Value Ref Range    TSH 3.605 0.400 - 4.000 uIU/mL   Lipid Panel    Collection Time: 02/28/24  1:25 PM   Result Value Ref Range    Cholesterol 210 (H) 120 - 199 mg/dL    Triglycerides 168 (H) 30 - 150 mg/dL    HDL 44 40 - 75 mg/dL    LDL Cholesterol 132.4 63.0 - 159.0 mg/dL    HDL/Cholesterol Ratio 21.0 20.0 - 50.0 %    Total Cholesterol/HDL Ratio 4.8 2.0 - 5.0    Non-HDL Cholesterol 166 mg/dL   PSA, Screening    Collection Time: 02/28/24  1:25 PM   Result Value Ref Range    PSA, Screen 0.73 0.00 - 4.00 ng/mL   Hemoglobin A1C    Collection Time: 02/28/24  1:25 PM   Result Value Ref Range    Hemoglobin A1C 4.7 4.0 - 5.6 %    Estimated Avg Glucose 88 68 - 131 mg/dL   Hepatitis C Antibody    Collection Time: 02/28/24  1:25 PM   Result Value Ref Range    Hepatitis C Ab Non-reactive Non-reactive   HIV 1/2 Ag/Ab (4th Gen)    Collection Time: 02/28/24  1:25 PM   Result Value Ref Range    HIV 1/2 Ag/Ab Non-reactive Non-reactive   Testosterone    Collection Time: 02/28/24  1:25 PM   Result Value Ref Range    Testosterone, Total 385 304 - 1227 ng/dL         Assessment    1. Class 3 severe obesity with serious comorbidity and body mass index (BMI) of 40.0 to 44.9 in adult, unspecified obesity type    2. Fatigue, unspecified type    3. Low testosterone    4. Primary hypertension          Plan    Diagnoses and all orders for this visit:    Class 3 severe obesity with serious comorbidity and body mass index (BMI) of 40.0 to 44.9 in adult, unspecified obesity type    Fatigue, unspecified type    Low testosterone    Primary hypertension      Physically, everything looks really good today.      Doing well on the Wegovy.  He was waiting until he runs out of the 1.7 mg dose to start on the 2.4 mg.  My recommendation is that we go ahead and just start on the 2.4 mg  dose now, rather than waiting.  This way, if he runs into supply issues in the future, he will have something to fall back on.    Blood pressure doing very well at home.    Fatigue should improve as the weight comes off.  We can plan to recheck testosterone levels, along with other screening labs February when it is time for his wellness exam.    FOLLOW-UP:  Follow up in about 6 months (around 11/28/2024) for recheck.    The patient location is:  [place]  The chief complaint leading to consultation is:  Weight loss, HTN, fatigue    Visit type: audiovisual    Face to Face time with patient: 25 minutes    35 minutes of total time spent on the encounter, which includes face to face time and non-face to face time preparing to see the patient (eg, review of tests), Obtaining and/or reviewing separately obtained history, Documenting clinical information in the electronic or other health record, Independently interpreting results (not separately reported) and communicating results to the patient/family/caregiver, or Care coordination (not separately reported). Visit today included increased complexity associated with the care of the episodic problem addressed and managing the longitudinal care of the patient due to the serious and/or complex managed problem(s).    Each patient to whom he or she provides medical services by telemedicine is:  (1) informed of the relationship between the physician and patient and the respective role of any other health care provider with respect to management of the patient; and (2) notified that he or she may decline to receive medical services by telemedicine and may withdraw from such care at any time.    Signed by:  Ashish Carrillo MD

## 2024-05-28 NOTE — PATIENT INSTRUCTIONS
On screen, everything looks really good.      My recommendation would be to just start using the 2.4 mg dose of Wegovy.  This way, you have the 1.7 mg stored as a backup in case we run into supply issues again.    Continue to eat a healthy diet.  Be careful with portion sizes.  Includes lots of fresh fruits, vegetables, whole grains, lean proteins.  See info below.    Keep hydrated.  Be sure to drink at least 8-10, 8 oz, glasses of water every day.    Stay active.  Try to do some sort of physical activity every day.  Nothing outrageous, just try walking for 10-15 minutes each day.

## 2024-10-10 ENCOUNTER — PATIENT MESSAGE (OUTPATIENT)
Dept: PRIMARY CARE CLINIC | Facility: CLINIC | Age: 39
End: 2024-10-10
Payer: COMMERCIAL

## 2024-10-26 ENCOUNTER — PATIENT MESSAGE (OUTPATIENT)
Dept: PRIMARY CARE CLINIC | Facility: CLINIC | Age: 39
End: 2024-10-26
Payer: COMMERCIAL

## 2024-10-29 ENCOUNTER — OFFICE VISIT (OUTPATIENT)
Dept: PRIMARY CARE CLINIC | Facility: CLINIC | Age: 39
End: 2024-10-29
Payer: COMMERCIAL

## 2024-10-29 VITALS — WEIGHT: 287 LBS | HEIGHT: 71 IN | BODY MASS INDEX: 40.18 KG/M2

## 2024-10-29 DIAGNOSIS — E66.813 CLASS 3 SEVERE OBESITY WITH SERIOUS COMORBIDITY AND BODY MASS INDEX (BMI) OF 40.0 TO 44.9 IN ADULT, UNSPECIFIED OBESITY TYPE: Primary | ICD-10-CM

## 2024-10-29 DIAGNOSIS — E66.01 CLASS 3 SEVERE OBESITY WITH SERIOUS COMORBIDITY AND BODY MASS INDEX (BMI) OF 40.0 TO 44.9 IN ADULT, UNSPECIFIED OBESITY TYPE: Primary | ICD-10-CM

## 2024-10-29 DIAGNOSIS — R53.83 FATIGUE, UNSPECIFIED TYPE: ICD-10-CM

## 2024-10-29 DIAGNOSIS — R79.89 LOW TESTOSTERONE: ICD-10-CM

## 2024-10-29 PROCEDURE — 3044F HG A1C LEVEL LT 7.0%: CPT | Mod: CPTII,95,, | Performed by: FAMILY MEDICINE

## 2024-10-29 PROCEDURE — 3008F BODY MASS INDEX DOCD: CPT | Mod: CPTII,95,, | Performed by: FAMILY MEDICINE

## 2024-10-29 PROCEDURE — G2211 COMPLEX E/M VISIT ADD ON: HCPCS | Mod: 95,,, | Performed by: FAMILY MEDICINE

## 2024-10-29 PROCEDURE — 99214 OFFICE O/P EST MOD 30 MIN: CPT | Mod: 95,,, | Performed by: FAMILY MEDICINE

## 2024-10-29 RX ORDER — TIRZEPATIDE 2.5 MG/.5ML
2.5 INJECTION, SOLUTION SUBCUTANEOUS
Qty: 2 ML | Refills: 12 | Status: SHIPPED | OUTPATIENT
Start: 2024-10-29

## 2024-11-21 ENCOUNTER — OFFICE VISIT (OUTPATIENT)
Dept: UROLOGY | Facility: CLINIC | Age: 39
End: 2024-11-21
Payer: COMMERCIAL

## 2024-11-21 VITALS
HEIGHT: 71 IN | WEIGHT: 289 LBS | SYSTOLIC BLOOD PRESSURE: 125 MMHG | HEART RATE: 87 BPM | DIASTOLIC BLOOD PRESSURE: 78 MMHG | BODY MASS INDEX: 40.46 KG/M2 | RESPIRATION RATE: 18 BRPM

## 2024-11-21 DIAGNOSIS — R53.83 FATIGUE, UNSPECIFIED TYPE: Primary | ICD-10-CM

## 2024-11-21 DIAGNOSIS — E66.89 OTHER OBESITY NOT ELSEWHERE CLASSIFIED: ICD-10-CM

## 2024-11-21 LAB
BILIRUB UR QL STRIP: NEGATIVE
GLUCOSE UR QL STRIP: NEGATIVE
KETONES UR QL STRIP: NEGATIVE
LEUKOCYTE ESTERASE UR QL STRIP: NEGATIVE
PH, POC UA: 7.5
POC BLOOD, URINE: NEGATIVE
POC NITRATES, URINE: NEGATIVE
PROT UR QL STRIP: NEGATIVE
SP GR UR STRIP: 1.02 (ref 1–1.03)
UROBILINOGEN UR STRIP-ACNC: 4 (ref 0.3–2.2)

## 2024-11-21 PROCEDURE — 99204 OFFICE O/P NEW MOD 45 MIN: CPT | Mod: S$GLB,,, | Performed by: UROLOGY

## 2024-11-21 PROCEDURE — 3074F SYST BP LT 130 MM HG: CPT | Mod: CPTII,S$GLB,, | Performed by: UROLOGY

## 2024-11-21 PROCEDURE — 3078F DIAST BP <80 MM HG: CPT | Mod: CPTII,S$GLB,, | Performed by: UROLOGY

## 2024-11-21 PROCEDURE — 3044F HG A1C LEVEL LT 7.0%: CPT | Mod: CPTII,S$GLB,, | Performed by: UROLOGY

## 2024-11-21 PROCEDURE — 99999 PR PBB SHADOW E&M-EST. PATIENT-LVL IV: CPT | Mod: PBBFAC,,, | Performed by: UROLOGY

## 2024-11-21 PROCEDURE — 3008F BODY MASS INDEX DOCD: CPT | Mod: CPTII,S$GLB,, | Performed by: UROLOGY

## 2024-11-21 PROCEDURE — 81003 URINALYSIS AUTO W/O SCOPE: CPT | Mod: QW,S$GLB,, | Performed by: UROLOGY

## 2024-11-21 PROCEDURE — 1159F MED LIST DOCD IN RCRD: CPT | Mod: CPTII,S$GLB,, | Performed by: UROLOGY

## 2024-11-21 NOTE — PROGRESS NOTES
"Subjective:       Patient ID: Reji Garcia is a 39 y.o. male.    Chief Complaint: Low Testosterone      History of Present Illness:     Mr Garcia is having problems with fatigue.  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  He has a history of a low testosterone level.  His testosterone has improved with weight loss.  He has never been on TRT.  He has obesity and he says has has lost approximately 50 pounds since March 2023.  He says he was on Wegovy for about 1.5 years.  He says he was recently started on zepbound.  He has sleep apnea and he says he sleeps with a CPAP machine.  No significant problems with ED.  No LUTS.         Past Medical History:   Diagnosis Date    Hypertension     Sleep apnea, unspecified      No family history on file.  Social History     Socioeconomic History    Marital status: Single   Tobacco Use    Smoking status: Former     Types: Cigarettes    Smokeless tobacco: Current     Types: Chew   Substance and Sexual Activity    Drug use: Yes     Outpatient Encounter Medications as of 11/21/2024   Medication Sig Dispense Refill    tirzepatide, weight loss, (ZEPBOUND) 2.5 mg/0.5 mL PnIj Inject 2.5 mg into the skin every 7 days. 2 mL 12    triamterene-hydrochlorothiazide 37.5-25 mg (DYAZIDE) 37.5-25 mg per capsule Take 1 capsule by mouth once daily. 90 capsule 3     No facility-administered encounter medications on file as of 11/21/2024.        Review of Systems   Constitutional:  Negative for chills and fever.   Respiratory:  Negative for shortness of breath.    Cardiovascular:  Negative for chest pain.   Gastrointestinal:  Negative for nausea and vomiting.   Genitourinary:  Negative for difficulty urinating.   Musculoskeletal:  Negative for back pain.   Skin:  Negative for rash.   Neurological:  Negative for dizziness.   Psychiatric/Behavioral:  Negative for agitation.        Objective:     /78   Pulse 87   Resp 18   Ht 5' 11" (1.803 m)   Wt 131.1 " Nutrition Assessment   Reason for Consult/Assessment: Initial, Other (comments), Wound consultConsult for diet education     Diagnosis and Hx: Reviewed    Pertinent Nutrition History: Patient gave very extensive history of weight loss struggles including note of 2016 abdominal panniculus removal, patient is looking for diet plan. Recommended patient meet with outpatint RD as that is something that would come from long term relationship and follow up. Discussed weight loss options; diet, exercise, RD referral, PT, talk therapy, obesity medications, bariatric  options etc.    Pertinent Nutrition Information: Patient taking protein drinks at home, open to logging food intakes.    STAAR Protocol: No                            Diet Order: Consistent carbohydrate                  Diet tolerance: Tolerating with good appetite/intakes recorded   Food Allergies: No    Demographic/Anthropometrics Information  Gender: male   Patient Age: 74 year old  Height:   Ht Readings from Last 1 Encounters:   12/11/22 5' 6\" (1.676 m)      Weight:   Wt Readings from Last 1 Encounters:   12/11/22 (!) 202.4 kg      BMI:   BMI Readings from Last 1 Encounters:   12/11/22 72.02 kg/m²          % Weight Change: weight stable +              NFPE  Nutrition Focused Physical Exam  Physical Exam Completed: No  Reason Not Completed: Remote coverage                      TREATMENT PLAN: Monitoring & Interventions   Intervention: Meals and snacks, Nutrition supplement therapy, Other (comments)Diet education re; wound healing                                                                    Nutrition supplement therapy: High protein gelatein with meals       Goal: Improve skin integrity, Increase oral intake to >/equal 75% of meals and supplements   Intervention goal status: New goal identified  Time frame to achieve goal: Ongoing     Dietitian will monitor: Food, beverage, and nutrient intake, Nutrition-focused physical findings      Nutrition Diagnosis  / PES  Nutrition Diagnosis: Increased nutrient needs  Related to: Increased nutritional demands for healing   As evidenced by: Calculated needs, Other (comments)related to wound healing     Primary Nutrition Diagnosis status: New nutrition diagnosis                   kg (289 lb 0.4 oz)   BMI 40.31 kg/m²     Physical Exam  Constitutional:       Appearance: Normal appearance. He is obese.   Pulmonary:      Effort: Pulmonary effort is normal.   Abdominal:      Palpations: Abdomen is soft.   Neurological:      Mental Status: He is alert and oriented to person, place, and time.   Psychiatric:         Mood and Affect: Mood normal.         Office Visit on 11/21/2024   Component Date Value Ref Range Status    POC Blood, Urine 11/21/2024 Negative  Negative Final    POC Bilirubin, Urine 11/21/2024 Negative  Negative Final    POC Urobilinogen, Urine 11/21/2024 4.0 (A)  0.3 - 2.2 Final    POC Ketones, Urine 11/21/2024 Negative  Negative Final    POC Protein, Urine 11/21/2024 Negative  Negative Final    POC Nitrates, Urine 11/21/2024 Negative  Negative Final    POC Glucose, Urine 11/21/2024 Negative  Negative Final    pH, UA 11/21/2024 7.5   Final    POC Specific Gravity, Urine 11/21/2024 1.020  1.003 - 1.029 Final    POC Leukocytes, Urine 11/21/2024 Negative  Negative Final        No results found for this or any previous visit (from the past 8760 hours).     Assessment:       1. Fatigue, unspecified type    2. Other obesity not elsewhere classified        Plan:     Orders Placed This Encounter    TESTOSTERONE PANEL    ESTROGENS, TOTAL    POCT Urinalysis, Dipstick, Automated, W/O Scope        2-28-24  PSA 0.73    2-28-24  Cr 0.9.  GFR >60.    2-28-24  HgbA1c 4.7    2-28-24  Total testosterone 385    1-5-22  Total testosterone 338    12-15-21  Total testosterone 271    2-28-24  Total estrogens 98 ()    I reviewed the above lab results.         Assessment:  - Fatigue.  History of a low testosterone level.  His testosterone has improved with weight loss.  He has never been on TRT.  - Obesity.  He says has has lost approximately 50 pounds since March 2023.  He says he was on Wegovy for about 1.5 years.  He says he was recently started on zepbound.  - Sleep apnea.  He says he sleeps with  a CPAP machine.    Plan:  - Testosterone panel and total estrogens on the morning of 12-2-24.  The patient will be out of town for the Thanksgiving holidays.  Further recommendations and follow up will be based on this lab work.  I explained that if all of his testosterone levels are within normal range on his testosterone panel that testosterone replacement therapy would not be indicated at this time.  - I had a long discussion with the patient today about the management options regarding patient's with hypogonadism.  I discussed various treatment options such as testosterone injections and topical treatment.   I discussed the risks and benefits with him today regarding testosterone replacement therapy.  I discussed the possible risk of dependence to being on testosterone replacement therapy once he starts on testosterone replacement therapy.  I discussed the risk of infertility.   He says that he does not have kids but his wife has kids.  He says he is not planning to have any kids.  I discussed the risk of secondary polycythemia which may require frequent blood donations.   I answered all of his questions to his apparent understanding and to his apparent satisfaction.   - I encouraged continued weight loss, a healthy diet, a healthy lifestyle, exercise, getting plenty of sleep and stress reduction as natural ways to try to increase his testosterone level.

## 2024-12-02 ENCOUNTER — LAB VISIT (OUTPATIENT)
Dept: LAB | Facility: HOSPITAL | Age: 39
End: 2024-12-02
Attending: UROLOGY
Payer: COMMERCIAL

## 2024-12-02 DIAGNOSIS — R53.83 FATIGUE, UNSPECIFIED TYPE: ICD-10-CM

## 2024-12-02 PROCEDURE — 36415 COLL VENOUS BLD VENIPUNCTURE: CPT | Mod: PN | Performed by: UROLOGY

## 2024-12-02 PROCEDURE — 84403 ASSAY OF TOTAL TESTOSTERONE: CPT | Performed by: UROLOGY

## 2024-12-02 PROCEDURE — 82671 ASSAY OF ESTROGENS: CPT | Performed by: UROLOGY

## 2024-12-06 ENCOUNTER — TELEPHONE (OUTPATIENT)
Dept: UROLOGY | Facility: CLINIC | Age: 39
End: 2024-12-06
Payer: COMMERCIAL

## 2024-12-06 LAB
ESTRADIOL SERPL HS-MCNC: 31 PG/ML (ref 10–42)
ESTROGEN SERPL CALC-MCNC: 71 PG/ML (ref 19–69)
ESTRONE SERPL-MCNC: 40 PG/ML (ref 9–36)

## 2024-12-06 NOTE — TELEPHONE ENCOUNTER
Tried contacting patient reference to Estrogen lab results. There was no answer, left voicemail for patient to return call.

## 2024-12-06 NOTE — TELEPHONE ENCOUNTER
----- Message from Robin Torres MD sent at 12/6/2024 12:46 PM CST -----  Please call Mr Jose and let him know that I reviewed his estrogen level and it was just slightly elevated at 71 (normal range is 19-69)  Explain that weight loss can help to lower the estrogen level.  His testosterone panel is still pending.  Ask him if he has any questions.

## 2024-12-09 ENCOUNTER — PATIENT MESSAGE (OUTPATIENT)
Dept: UROLOGY | Facility: CLINIC | Age: 39
End: 2024-12-09
Payer: COMMERCIAL

## 2024-12-20 ENCOUNTER — PATIENT MESSAGE (OUTPATIENT)
Dept: PRIMARY CARE CLINIC | Facility: CLINIC | Age: 39
End: 2024-12-20
Payer: COMMERCIAL

## 2025-01-31 ENCOUNTER — OFFICE VISIT (OUTPATIENT)
Dept: PRIMARY CARE CLINIC | Facility: CLINIC | Age: 40
End: 2025-01-31
Payer: COMMERCIAL

## 2025-01-31 DIAGNOSIS — R79.89 LOW TESTOSTERONE: ICD-10-CM

## 2025-01-31 DIAGNOSIS — R53.83 FATIGUE, UNSPECIFIED TYPE: ICD-10-CM

## 2025-01-31 DIAGNOSIS — Z12.5 ENCOUNTER FOR SCREENING FOR MALIGNANT NEOPLASM OF PROSTATE: ICD-10-CM

## 2025-01-31 DIAGNOSIS — E66.01 CLASS 3 SEVERE OBESITY WITH SERIOUS COMORBIDITY AND BODY MASS INDEX (BMI) OF 40.0 TO 44.9 IN ADULT, UNSPECIFIED OBESITY TYPE: Primary | ICD-10-CM

## 2025-01-31 DIAGNOSIS — I10 PRIMARY HYPERTENSION: ICD-10-CM

## 2025-01-31 DIAGNOSIS — E66.813 CLASS 3 SEVERE OBESITY WITH SERIOUS COMORBIDITY AND BODY MASS INDEX (BMI) OF 40.0 TO 44.9 IN ADULT, UNSPECIFIED OBESITY TYPE: Primary | ICD-10-CM

## 2025-01-31 PROCEDURE — G2211 COMPLEX E/M VISIT ADD ON: HCPCS | Mod: 95,,, | Performed by: FAMILY MEDICINE

## 2025-01-31 PROCEDURE — 98006 SYNCH AUDIO-VIDEO EST MOD 30: CPT | Mod: 95,,, | Performed by: FAMILY MEDICINE

## 2025-01-31 RX ORDER — TIRZEPATIDE 5 MG/.5ML
5 INJECTION, SOLUTION SUBCUTANEOUS
Qty: 2 ML | Refills: 12 | Status: SHIPPED | OUTPATIENT
Start: 2025-01-31

## 2025-01-31 NOTE — PATIENT INSTRUCTIONS
On screen, everything looks really good.      Let us go ahead and increase your dose of ZepBound to 5 mg weekly.  New prescription sent to pharmacy.    Let us also try and ramp you up rather quickly.  In four weeks, I will send another prescription for 7.5 mg.  Then, four weeks later, I will send an additional prescription for 10 mg weekly.    I would like to see you back in about three months to check your progress.  At that time, we can decide whether or not to increase the dose further.    Let us also get some screening blood work done before that visit.  Orders placed.  Just stop by the clinic a few days prior to get the blood drawn.  This way we can discuss the results together.      Continue to eat a healthy diet.  Be careful with portion sizes.  Includes lots of fresh fruits, vegetables, whole grains, lean proteins.  See info below.    Keep hydrated.  Be sure to drink at least 8-10, 8 oz, glasses of water every day.    Stay active.  Try to do some sort of physical activity every day.  Nothing outrageous, just try walking for 10-15 minutes each day.

## 2025-01-31 NOTE — PROGRESS NOTES
"    [facility]       [address] [place] [address]      Phone: [phone]      Fax: [phone]    Ashish Carrillo MD                Video Visit  01/31/2025        Subjective      HPI:  Reji Garcia is a 39 y.o. male presents today via video for "No chief complaint on file.  ."     39-year-old gentleman presents today via video visit to discuss multiple issues.      Overall, feels okay.  No chest pain, shortness on breath.  No fever, chills, body aches.  No coughing, sneezing, URI type symptoms.  Appetite normal.  Bowel movements normal.  No urinary issues.      Still working on weight loss.  When he first started, he was around 344 lb.  Was able to get all the way down to 279 lb, but gained a few lb back.  Last visit, he was 287 lb at home.  Previously, was using Wegovy (semaglutide) 2.4 mg weekly.  Weight had plateaued, so last November we switched him to ZepBound.  He has been on ZepBound 2.5 mg ever since.  Still watching diet.  Making healthier food choices.  Trying to watch portion sizes this is that they have been bigger since making the switch..  Staying active.  Walking daily, at least 30 minutes.      Still feels tired, fatigued.  Not as bad since he lost some weight, but still noticeable.  Uses CPAP nightly with no issue.  Testosterone levels has been at the lower end of normal for the last few years.  He saw Urology.  They recommended continuing to work on weight loss before considering testosterone replacement.  Because his levels are not clinically low, insurance is not likely to cover it.    Also has hypertension.  Takes triamterene-HCTZ.  Blood pressure at home typically in the normal range.    PMH: HTN.  Low T.  ELEAZAR.  PSH: Bilateral cholesteatoma.  Umbilical hernia.  Left knee.  Left shoulder.    F MH:  None reported   Allergies:  Codeine unknown reaction, was a kid.    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    T: Denies  A: " Occasionally  D:  Denies    Exercise:  Daily.        The following were updated and reviewed by myself in the chart: medications, past medical history, past surgical history, family history, social history, and allergies.     Medications:  Current Outpatient Medications on File Prior to Visit   Medication Sig Dispense Refill    triamterene-hydrochlorothiazide 37.5-25 mg (DYAZIDE) 37.5-25 mg per capsule Take 1 capsule by mouth once daily. 90 capsule 3    [DISCONTINUED] tirzepatide, weight loss, (ZEPBOUND) 2.5 mg/0.5 mL PnIj Inject 2.5 mg into the skin every 7 days. 2 mL 12     No current facility-administered medications on file prior to visit.        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     Social:  Social History     Socioeconomic History    Marital status: Single   Tobacco Use    Smoking status: Former     Types: Cigarettes    Smokeless tobacco: Current     Types: Chew   Substance and Sexual Activity    Drug use: Yes        Allergies:  Review of patient's allergies indicates:   Allergen Reactions    Codeine Other (See Comments)     Unknown, reaction happened as a child        ROS:  Review of Systems   Constitutional:  Positive for fatigue. Negative for activity change, appetite change, chills, fever and unexpected weight change.   HENT:  Negative for congestion, hearing  "loss, postnasal drip, rhinorrhea, sore throat and trouble swallowing.    Eyes:  Negative for discharge and visual disturbance.   Respiratory:  Negative for cough, chest tightness, shortness of breath and wheezing.    Cardiovascular:  Negative for chest pain and palpitations.   Gastrointestinal:  Negative for abdominal pain, blood in stool, constipation, diarrhea, nausea and vomiting.   Endocrine: Negative for polydipsia and polyuria.   Genitourinary:  Negative for difficulty urinating, hematuria and urgency.   Musculoskeletal:  Negative for arthralgias, joint swelling, myalgias and neck pain.   Skin:  Negative for color change and rash.   Neurological:  Negative for weakness and headaches.   Psychiatric/Behavioral:  Negative for confusion and dysphoric mood.    All other systems reviewed and are negative.         Objective      There were no vitals taken for this visit.  Ht Readings from Last 3 Encounters:   11/21/24 5' 11" (1.803 m)   10/29/24 5' 11" (1.803 m)   02/28/24 5' 11" (1.803 m)     Wt Readings from Last 3 Encounters:   11/21/24 131.1 kg (289 lb 0.4 oz)   10/29/24 130.2 kg (287 lb)   02/28/24 132.9 kg (292 lb 15.9 oz)       PHYSICAL EXAM:  Physical Exam  Constitutional:       General: He is not in acute distress.     Appearance: Normal appearance. He is not ill-appearing.   HENT:      Head: Normocephalic and atraumatic.   Pulmonary:      Effort: Pulmonary effort is normal. No respiratory distress.   Neurological:      Mental Status: He is alert.   Psychiatric:         Mood and Affect: Mood normal.         Behavior: Behavior normal.         Thought Content: Thought content normal.              LABS / IMAGING:  Recent Results (from the past 26 weeks)   POCT Urinalysis, Dipstick, Automated, W/O Scope    Collection Time: 11/21/24  1:34 PM   Result Value Ref Range    POC Blood, Urine Negative Negative    POC Bilirubin, Urine Negative Negative    POC Urobilinogen, Urine 4.0 (A) 0.3 - 2.2    POC Ketones, Urine " Negative Negative    POC Protein, Urine Negative Negative    POC Nitrates, Urine Negative Negative    POC Glucose, Urine Negative Negative    pH, UA 7.5     POC Specific Gravity, Urine 1.020 1.003 - 1.029    POC Leukocytes, Urine Negative Negative   TESTOSTERONE PANEL    Collection Time: 12/02/24 10:10 AM   Result Value Ref Range    Testosterone 368 250 - 1100 ng/dL    Testosterone, Free 64.1 46.0 - 224.0 pg/mL    Testosterone, Bioavailable 137.3 ng/dL    Sex Hormone Binding Globulin 22 10 - 50 nmol/L    Albumin 4.7 3.6 - 5.1 g/dL   ESTROGENS, TOTAL    Collection Time: 12/02/24 10:10 AM   Result Value Ref Range    Estrone (Esoterix) 40 (H) 9 - 36 pg/mL    Estradiol 31 10 - 42 pg/mL    Estrogen 71 (H) 19 - 69 pg/mL         Assessment    1. Class 3 severe obesity with serious comorbidity and body mass index (BMI) of 40.0 to 44.9 in adult, unspecified obesity type    2. Fatigue, unspecified type    3. Low testosterone    4. Primary hypertension    5. Encounter for screening for malignant neoplasm of prostate          Plan    Diagnoses and all orders for this visit:    Class 3 severe obesity with serious comorbidity and body mass index (BMI) of 40.0 to 44.9 in adult, unspecified obesity type  -     tirzepatide, weight loss, (ZEPBOUND) 5 mg/0.5 mL PnIj; Inject 5 mg into the skin every 7 days.  -     Lipid Panel; Future  -     TSH; Future  -     Comprehensive Metabolic Panel; Future  -     CBC Auto Differential; Future  -     Hemoglobin A1C; Future  -     PSA, Screening; Future  -     Testosterone; Future    Fatigue, unspecified type  -     Lipid Panel; Future  -     TSH; Future  -     Comprehensive Metabolic Panel; Future  -     CBC Auto Differential; Future  -     Hemoglobin A1C; Future  -     PSA, Screening; Future  -     Testosterone; Future    Low testosterone  -     Testosterone; Future    Primary hypertension  -     Lipid Panel; Future  -     TSH; Future  -     Comprehensive Metabolic Panel; Future  -     CBC Auto  Differential; Future    Encounter for screening for malignant neoplasm of prostate  -     PSA, Screening; Future    On screen, looks good.      Back in October when we started him on ZepBound 2.5 mg, original plan was to touch base in four weeks to discuss dose increase.  Instead, he has been on the same dose this whole time.  Likely contributing to increase cravings, increased portion sizes.    Increase ZepBound to 5 mg today.  In four weeks, reminder set to increase to 7.5 mg.  Then, in eight weeks, increase to 10 mg.    PA initiated.  Response:   Closed   1/31/2025  3:25 PM  Close reason: Prior Authorization not required for patient/medication   Note from payer: Available without authorization.   Payer: [employer] and [employer] [facility] Commercial Case ID: [id number]    We will plan to touch base again in about three months for wellness exam.  Labs prior.  Orders placed.    FOLLOW-UP:  Follow up in about 3 months (around 4/30/2025) for annual exam, labs 1 week prior.    The patient location is:  [place]  The chief complaint leading to consultation is:  Weight loss, HTN    Visit type: audiovisual    Face to Face time with patient: 20 minutes    30 minutes of total time spent on the encounter, which includes face to face time and non-face to face time preparing to see the patient (eg, review of tests), Obtaining and/or reviewing separately obtained history, Documenting clinical information in the electronic or other health record, Independently interpreting results (not separately reported) and communicating results to the patient/family/caregiver, or Care coordination (not separately reported). Visit today included increased complexity associated with the care of the episodic problem addressed and managing the longitudinal care of the patient due to the serious and/or complex managed problem(s).    Each patient to whom he or she provides medical services by telemedicine is:  (1) informed of the  relationship between the physician and patient and the respective role of any other health care provider with respect to management of the patient; and (2) notified that he or she may decline to receive medical services by telemedicine and may withdraw from such care at any time.    Signed by:  Ashish Carrillo MD

## 2025-02-21 DIAGNOSIS — E66.01 CLASS 3 SEVERE OBESITY WITH SERIOUS COMORBIDITY AND BODY MASS INDEX (BMI) OF 40.0 TO 44.9 IN ADULT, UNSPECIFIED OBESITY TYPE: Primary | ICD-10-CM

## 2025-02-21 DIAGNOSIS — E66.813 CLASS 3 SEVERE OBESITY WITH SERIOUS COMORBIDITY AND BODY MASS INDEX (BMI) OF 40.0 TO 44.9 IN ADULT, UNSPECIFIED OBESITY TYPE: Primary | ICD-10-CM

## 2025-02-21 RX ORDER — TIRZEPATIDE 7.5 MG/.5ML
7.5 INJECTION, SOLUTION SUBCUTANEOUS
Qty: 2 ML | Refills: 12 | Status: SHIPPED | OUTPATIENT
Start: 2025-02-21

## 2025-03-25 DIAGNOSIS — I10 PRIMARY HYPERTENSION: ICD-10-CM

## 2025-03-25 RX ORDER — TRIAMTERENE AND HYDROCHLOROTHIAZIDE 37.5; 25 MG/1; MG/1
1 CAPSULE ORAL DAILY
Qty: 30 CAPSULE | Refills: 0 | Status: SHIPPED | OUTPATIENT
Start: 2025-03-25

## 2025-03-25 NOTE — TELEPHONE ENCOUNTER
Refill Routing Note   Medication(s) are not appropriate for processing by [facility] for the following reason(s):        Required labs outdated    [facility] action(s):  Defer     Requires labs : Yes             Appointments  past 12m or future 3m with PCP    Date Provider   Last Visit   1/31/2025 Ashish Carrillo MD   Next Visit   5/1/2025 Ashsih Carrillo MD   ED visits in past 90 days: 0        Note composed:2:16 PM 03/25/2025

## 2025-04-01 DIAGNOSIS — E66.813 CLASS 3 SEVERE OBESITY WITH SERIOUS COMORBIDITY AND BODY MASS INDEX (BMI) OF 40.0 TO 44.9 IN ADULT, UNSPECIFIED OBESITY TYPE: Primary | ICD-10-CM

## 2025-04-01 DIAGNOSIS — E66.01 CLASS 3 SEVERE OBESITY WITH SERIOUS COMORBIDITY AND BODY MASS INDEX (BMI) OF 40.0 TO 44.9 IN ADULT, UNSPECIFIED OBESITY TYPE: Primary | ICD-10-CM

## 2025-04-01 DIAGNOSIS — G47.33 OSA (OBSTRUCTIVE SLEEP APNEA): ICD-10-CM

## 2025-04-01 RX ORDER — TIRZEPATIDE 10 MG/.5ML
10 INJECTION, SOLUTION SUBCUTANEOUS
Qty: 2 ML | Refills: 12 | Status: SHIPPED | OUTPATIENT
Start: 2025-04-01

## 2025-04-22 DIAGNOSIS — I10 PRIMARY HYPERTENSION: ICD-10-CM

## 2025-04-22 RX ORDER — TRIAMTERENE AND HYDROCHLOROTHIAZIDE 37.5; 25 MG/1; MG/1
1 CAPSULE ORAL DAILY
Qty: 90 CAPSULE | Refills: 0 | Status: SHIPPED | OUTPATIENT
Start: 2025-04-22

## 2025-04-22 NOTE — TELEPHONE ENCOUNTER
No care due was identified.  [facility] Embedded Care Due Messages. Reference number: [id number].   4/22/2025 7:01:05 AM CDT

## 2025-04-22 NOTE — TELEPHONE ENCOUNTER
Refill Routing Note   Medication(s) are not appropriate for processing by [facility] for the following reason(s):        Required labs outdated    [facility] action(s):  Defer               Appointments  past 12m or future 3m with PCP    Date Provider   Last Visit   1/31/2025 Ashish Carrillo MD   Next Visit   5/1/2025 Ashish Carrillo MD   ED visits in past 90 days: 0        Note composed:1:28 PM 04/22/2025

## 2025-04-24 ENCOUNTER — LAB VISIT (OUTPATIENT)
Dept: LAB | Facility: HOSPITAL | Age: 40
End: 2025-04-24
Attending: FAMILY MEDICINE
Payer: COMMERCIAL

## 2025-04-24 DIAGNOSIS — E66.813 CLASS 3 SEVERE OBESITY WITH SERIOUS COMORBIDITY AND BODY MASS INDEX (BMI) OF 40.0 TO 44.9 IN ADULT, UNSPECIFIED OBESITY TYPE: ICD-10-CM

## 2025-04-24 DIAGNOSIS — R79.89 LOW TESTOSTERONE: ICD-10-CM

## 2025-04-24 DIAGNOSIS — R53.83 FATIGUE, UNSPECIFIED TYPE: ICD-10-CM

## 2025-04-24 DIAGNOSIS — I10 PRIMARY HYPERTENSION: ICD-10-CM

## 2025-04-24 DIAGNOSIS — E66.01 CLASS 3 SEVERE OBESITY WITH SERIOUS COMORBIDITY AND BODY MASS INDEX (BMI) OF 40.0 TO 44.9 IN ADULT, UNSPECIFIED OBESITY TYPE: ICD-10-CM

## 2025-04-24 DIAGNOSIS — Z12.5 ENCOUNTER FOR SCREENING FOR MALIGNANT NEOPLASM OF PROSTATE: ICD-10-CM

## 2025-04-24 LAB
ABSOLUTE EOSINOPHIL (OHS): 0.11 K/UL
ABSOLUTE MONOCYTE (OHS): 0.49 K/UL (ref 0.3–1)
ABSOLUTE NEUTROPHIL COUNT (OHS): 3.7 K/UL (ref 1.8–7.7)
ALBUMIN SERPL BCP-MCNC: 4.2 G/DL (ref 3.5–5.2)
ALP SERPL-CCNC: 65 UNIT/L (ref 40–150)
ALT SERPL W/O P-5'-P-CCNC: 20 UNIT/L (ref 10–44)
ANION GAP (OHS): 7 MMOL/L (ref 8–16)
AST SERPL-CCNC: 17 UNIT/L (ref 11–45)
BASOPHILS # BLD AUTO: 0.04 K/UL
BASOPHILS NFR BLD AUTO: 0.6 %
BILIRUB SERPL-MCNC: 0.8 MG/DL (ref 0.1–1)
BUN SERPL-MCNC: 19 MG/DL (ref 6–20)
CALCIUM SERPL-MCNC: 9 MG/DL (ref 8.7–10.5)
CHLORIDE SERPL-SCNC: 106 MMOL/L (ref 95–110)
CHOLEST SERPL-MCNC: 172 MG/DL (ref 120–199)
CHOLEST/HDLC SERPL: 4.3 {RATIO} (ref 2–5)
CO2 SERPL-SCNC: 28 MMOL/L (ref 23–29)
CREAT SERPL-MCNC: 0.8 MG/DL (ref 0.5–1.4)
EAG (OHS): 91 MG/DL (ref 68–131)
ERYTHROCYTE [DISTWIDTH] IN BLOOD BY AUTOMATED COUNT: 12.8 % (ref 11.5–14.5)
GFR SERPLBLD CREATININE-BSD FMLA CKD-EPI: >60 ML/MIN/1.73/M2
GLUCOSE SERPL-MCNC: 81 MG/DL (ref 70–110)
HBA1C MFR BLD: 4.8 % (ref 4–5.6)
HCT VFR BLD AUTO: 45 % (ref 40–54)
HDLC SERPL-MCNC: 40 MG/DL (ref 40–75)
HDLC SERPL: 23.3 % (ref 20–50)
HGB BLD-MCNC: 14.6 GM/DL (ref 14–18)
IMM GRANULOCYTES # BLD AUTO: 0.02 K/UL (ref 0–0.04)
IMM GRANULOCYTES NFR BLD AUTO: 0.3 % (ref 0–0.5)
LDLC SERPL CALC-MCNC: 96.8 MG/DL (ref 63–159)
LYMPHOCYTES # BLD AUTO: 2.02 K/UL (ref 1–4.8)
MCH RBC QN AUTO: 28.7 PG (ref 27–31)
MCHC RBC AUTO-ENTMCNC: 32.4 G/DL (ref 32–36)
MCV RBC AUTO: 88 FL (ref 82–98)
NONHDLC SERPL-MCNC: 132 MG/DL
NUCLEATED RBC (/100WBC) (OHS): 0 /100 WBC
PLATELET # BLD AUTO: 207 K/UL (ref 150–450)
PMV BLD AUTO: 11 FL (ref 9.2–12.9)
POTASSIUM SERPL-SCNC: 4.2 MMOL/L (ref 3.5–5.1)
PROT SERPL-MCNC: 7.6 GM/DL (ref 6–8.4)
PSA SERPL-MCNC: 0.98 NG/ML
RBC # BLD AUTO: 5.09 M/UL (ref 4.6–6.2)
RELATIVE EOSINOPHIL (OHS): 1.7 %
RELATIVE LYMPHOCYTE (OHS): 31.7 % (ref 18–48)
RELATIVE MONOCYTE (OHS): 7.7 % (ref 4–15)
RELATIVE NEUTROPHIL (OHS): 58 % (ref 38–73)
SODIUM SERPL-SCNC: 141 MMOL/L (ref 136–145)
TESTOST SERPL-MCNC: 383 NG/DL (ref 304–1227)
TRIGL SERPL-MCNC: 176 MG/DL (ref 30–150)
TSH SERPL-ACNC: 3.99 UIU/ML (ref 0.4–4)
WBC # BLD AUTO: 6.38 K/UL (ref 3.9–12.7)

## 2025-04-24 PROCEDURE — 84403 ASSAY OF TOTAL TESTOSTERONE: CPT

## 2025-04-24 PROCEDURE — 83036 HEMOGLOBIN GLYCOSYLATED A1C: CPT

## 2025-04-24 PROCEDURE — 84443 ASSAY THYROID STIM HORMONE: CPT

## 2025-04-24 PROCEDURE — 82465 ASSAY BLD/SERUM CHOLESTEROL: CPT

## 2025-04-24 PROCEDURE — 82947 ASSAY GLUCOSE BLOOD QUANT: CPT

## 2025-04-24 PROCEDURE — 85025 COMPLETE CBC W/AUTO DIFF WBC: CPT

## 2025-04-24 PROCEDURE — 36415 COLL VENOUS BLD VENIPUNCTURE: CPT | Mod: PN

## 2025-04-24 PROCEDURE — 84153 ASSAY OF PSA TOTAL: CPT

## 2025-05-01 ENCOUNTER — OFFICE VISIT (OUTPATIENT)
Dept: PRIMARY CARE CLINIC | Facility: CLINIC | Age: 40
End: 2025-05-01
Payer: COMMERCIAL

## 2025-05-01 VITALS
SYSTOLIC BLOOD PRESSURE: 110 MMHG | TEMPERATURE: 98 F | BODY MASS INDEX: 37.97 KG/M2 | DIASTOLIC BLOOD PRESSURE: 64 MMHG | HEART RATE: 73 BPM | HEIGHT: 71 IN | WEIGHT: 271.19 LBS | OXYGEN SATURATION: 97 %

## 2025-05-01 DIAGNOSIS — E66.813 CLASS 3 SEVERE OBESITY WITH SERIOUS COMORBIDITY AND BODY MASS INDEX (BMI) OF 40.0 TO 44.9 IN ADULT, UNSPECIFIED OBESITY TYPE: ICD-10-CM

## 2025-05-01 DIAGNOSIS — Z12.5 ENCOUNTER FOR SCREENING FOR MALIGNANT NEOPLASM OF PROSTATE: ICD-10-CM

## 2025-05-01 DIAGNOSIS — Z00.00 ANNUAL PHYSICAL EXAM: Primary | ICD-10-CM

## 2025-05-01 DIAGNOSIS — G47.33 OSA (OBSTRUCTIVE SLEEP APNEA): ICD-10-CM

## 2025-05-01 DIAGNOSIS — R53.83 FATIGUE, UNSPECIFIED TYPE: ICD-10-CM

## 2025-05-01 DIAGNOSIS — R79.89 LOW TESTOSTERONE: ICD-10-CM

## 2025-05-01 DIAGNOSIS — E66.01 CLASS 3 SEVERE OBESITY WITH SERIOUS COMORBIDITY AND BODY MASS INDEX (BMI) OF 40.0 TO 44.9 IN ADULT, UNSPECIFIED OBESITY TYPE: ICD-10-CM

## 2025-05-01 DIAGNOSIS — I10 PRIMARY HYPERTENSION: ICD-10-CM

## 2025-05-01 PROCEDURE — 99999 PR PBB SHADOW E&M-EST. PATIENT-LVL IV: CPT | Mod: PBBFAC,,, | Performed by: FAMILY MEDICINE

## 2025-05-01 RX ORDER — TIRZEPATIDE 10 MG/.5ML
10 INJECTION, SOLUTION SUBCUTANEOUS
Qty: 2 ML | Refills: 12 | Status: SHIPPED | OUTPATIENT
Start: 2025-05-01

## 2025-05-01 RX ORDER — TRIAMTERENE AND HYDROCHLOROTHIAZIDE 37.5; 25 MG/1; MG/1
1 CAPSULE ORAL DAILY
Qty: 90 CAPSULE | Refills: 0 | Status: SHIPPED | OUTPATIENT
Start: 2025-05-01

## 2025-05-01 NOTE — PROGRESS NOTES
"    [facility]       [address] [place] [address]      Phone: [phone]      Fax: [phone]    Ashish Carrillo MD                Office Visit  05/01/2025        Subjective      HPI:  Reji Garcia is a 39 y.o. male presents today in clinic for "Annual Exam  ."     39-year-old gentleman presents today for annual wellness exam.    Overall, feels okay.  No chest pain, shortness on breath.  No fever, chills, body aches.  No coughing, sneezing, URI type symptoms.  Appetite normal.  Bowel movements normal.  No urinary issues.      Still working on weight loss.  When he first started, he was around 344 lb.  Previously, was using Wegovy (semaglutide) 2.4 mg weekly.  Weight had plateaued, so we switched him to ZepBound.  Currently taking 10 mg weekly.  Just started on the 10 mg last week.  Still watching diet.  Making healthier food choices.  Trying to watch portion sizes.  Staying active.  Walking daily, at least 30 minutes.  Unfortunately, he got a letter from his insurance company.  As of July 1, they are instituting a $5000 lifetime max on weight loss medications.  After that, he can still get them, but has to pay full price.  Will not count towards his deductible.    Was dealing with some fatigue, tiredness.  As he has lost weight, that has improved.  Currently, states he feels like he has the most energy he has ever had.  Uses CPAP nightly with no issue.  Testosterone levels has been at the lower end of normal for the last few years.  He saw Urology.  They recommended continuing to work on weight loss before considering testosterone replacement.  Because his levels are not clinically low, insurance is not likely to cover it.    Also has hypertension.  Takes triamterene-HCTZ.  Blood pressure at home typically in the normal range.  He states that it was his ENT that originally put him on the medication.    PMH: HTN.  Low T.  ELEAZAR.  PSH: Bilateral cholesteatoma. "  Umbilical hernia.  Left knee.  Left shoulder.    F MH:  None reported   Allergies:  Codeine unknown reaction, was a kid.    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    T: Denies  A: Occasionally  D:  Denies    Exercise:  Daily.        The following were updated and reviewed by myself in the chart: medications, past medical history, past surgical history, family history, social history, and allergies.     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     Social:  Social History     Socioeconomic History    Marital status: Single   Tobacco Use    Smoking status: Former     Types: Cigarettes    Smokeless tobacco: Current     Types: Chew   Substance and Sexual Activity    Drug use: Yes        Allergies:  Review of patient's allergies indicates:   Allergen Reactions    Codeine Other (See Comments)     Unknown, reaction happened as a child        ROS:  Review of Systems   Constitutional:  Negative for activity change, appetite change, chills and fever.   HENT:  Negative for congestion, postnasal drip, rhinorrhea, sore throat and trouble swallowing.    Respiratory:  Negative for cough and shortness of breath.    Cardiovascular:  Negative for chest pain and palpitations.   Gastrointestinal:  Negative for abdominal pain, constipation,  "diarrhea, nausea and vomiting.   Genitourinary:  Negative for difficulty urinating.   Musculoskeletal:  Negative for arthralgias and myalgias.   Skin:  Negative for color change and rash.   Neurological:  Negative for headaches.   All other systems reviewed and are negative.         Objective      /64 (BP Location: Right arm, Patient Position: Sitting)   Pulse 73   Temp 97.5 °F (36.4 °C) (Tympanic)   Ht 5' 11" (1.803 m)   Wt 123 kg (271 lb 2.7 oz)   SpO2 97%   BMI 37.82 kg/m²   Ht Readings from Last 3 Encounters:   05/01/25 5' 11" (1.803 m)   11/21/24 5' 11" (1.803 m)   10/29/24 5' 11" (1.803 m)     Wt Readings from Last 3 Encounters:   05/01/25 123 kg (271 lb 2.7 oz)   11/21/24 131.1 kg (289 lb 0.4 oz)   10/29/24 130.2 kg (287 lb)       PHYSICAL EXAM:  Physical Exam  Vitals and nursing note reviewed.   Constitutional:       General: He is not in acute distress.     Appearance: Normal appearance.   HENT:      Head: Normocephalic and atraumatic.      Right Ear: Tympanic membrane, ear canal and external ear normal.      Left Ear: Tympanic membrane, ear canal and external ear normal.      Nose: Nose normal. No congestion or rhinorrhea.      Mouth/Throat:      Mouth: Mucous membranes are moist.      Pharynx: Oropharynx is clear. No oropharyngeal exudate or posterior oropharyngeal erythema.   Eyes:      Extraocular Movements: Extraocular movements intact.      Conjunctiva/sclera: Conjunctivae normal.      Pupils: Pupils are equal, round, and reactive to light.   Cardiovascular:      Rate and Rhythm: Normal rate and regular rhythm.   Pulmonary:      Effort: Pulmonary effort is normal.      Breath sounds: No wheezing, rhonchi or rales.   Musculoskeletal:         General: Normal range of motion.      Cervical back: Normal range of motion.   Lymphadenopathy:      Cervical: No cervical adenopathy.   Skin:     General: Skin is warm and dry.   Neurological:      General: No focal deficit present.      Mental Status: " He is alert.              LABS / IMAGING:  Recent Results (from the past 26 weeks)   POCT Urinalysis, Dipstick, Automated, W/O Scope    Collection Time: 11/21/24  1:34 PM   Result Value Ref Range    POC Blood, Urine Negative Negative    POC Bilirubin, Urine Negative Negative    POC Urobilinogen, Urine 4.0 (A) 0.3 - 2.2    POC Ketones, Urine Negative Negative    POC Protein, Urine Negative Negative    POC Nitrates, Urine Negative Negative    POC Glucose, Urine Negative Negative    pH, UA 7.5     POC Specific Gravity, Urine 1.020 1.003 - 1.029    POC Leukocytes, Urine Negative Negative   TESTOSTERONE PANEL    Collection Time: 12/02/24 10:10 AM   Result Value Ref Range    Testosterone 368 250 - 1100 ng/dL    Testosterone, Free 64.1 46.0 - 224.0 pg/mL    Testosterone, Bioavailable 137.3 ng/dL    Sex Hormone Binding Globulin 22 10 - 50 nmol/L    Albumin 4.7 3.6 - 5.1 g/dL   ESTROGENS, TOTAL    Collection Time: 12/02/24 10:10 AM   Result Value Ref Range    Estrone (Esoterix) 40 (H) 9 - 36 pg/mL    Estradiol 31 10 - 42 pg/mL    Estrogen 71 (H) 19 - 69 pg/mL   Lipid Panel    Collection Time: 04/24/25  7:14 AM   Result Value Ref Range    Cholesterol Total 172 120 - 199 mg/dL    Triglyceride 176 (H) 30 - 150 mg/dL    HDL Cholesterol 40 40 - 75 mg/dL    LDL Cholesterol 96.8 63.0 - 159.0 mg/dL    HDL/Cholesterol Ratio 23.3 20.0 - 50.0 %    Cholesterol/HDL Ratio 4.3 2.0 - 5.0    Non HDL Cholesterol 132 mg/dL   TSH    Collection Time: 04/24/25  7:14 AM   Result Value Ref Range    TSH 3.986 0.400 - 4.000 uIU/mL   Comprehensive Metabolic Panel    Collection Time: 04/24/25  7:14 AM   Result Value Ref Range    Sodium 141 136 - 145 mmol/L    Potassium 4.2 3.5 - 5.1 mmol/L    Chloride 106 95 - 110 mmol/L    CO2 28 23 - 29 mmol/L    Glucose 81 70 - 110 mg/dL    BUN 19 6 - 20 mg/dL    Creatinine 0.8 0.5 - 1.4 mg/dL    Calcium 9.0 8.7 - 10.5 mg/dL    Protein Total 7.6 6.0 - 8.4 gm/dL    Albumin 4.2 3.5 - 5.2 g/dL    Bilirubin Total 0.8  "0.1 - 1.0 mg/dL    ALP 65 40 - 150 unit/L    AST 17 11 - 45 unit/L    ALT 20 10 - 44 unit/L    Anion Gap 7 (L) 8 - 16 mmol/L    eGFR >60 >60 mL/min/1.73/m2   Hemoglobin A1C    Collection Time: 04/24/25  7:14 AM   Result Value Ref Range    Hemoglobin A1c 4.8 4.0 - 5.6 %    Estimated Average Glucose 91 68 - 131 mg/dL   PSA, Screening    Collection Time: 04/24/25  7:14 AM   Result Value Ref Range    Prostate Specific Antigen 0.98 <=4.00 ng/mL   Testosterone    Collection Time: 04/24/25  7:14 AM   Result Value Ref Range    Testosterone Total 383 304 - 1,227 ng/dL   CBC with Differential    Collection Time: 04/24/25  7:14 AM   Result Value Ref Range    WBC 6.38 3.90 - 12.70 K/uL    RBC 5.09 4.60 - 6.20 M/uL    HGB 14.6 14.0 - 18.0 gm/dL    HCT 45.0 40.0 - 54.0 %    MCV 88 82 - 98 fL    MCH 28.7 27.0 - 31.0 pg    MCHC 32.4 32.0 - 36.0 g/dL    RDW 12.8 11.5 - 14.5 %    Platelet Count 207 150 - 450 K/uL    MPV 11.0 9.2 - 12.9 fL    Nucleated RBC 0 <=0 /100 WBC    Neut % 58.0 38 - 73 %    Lymph % 31.7 18 - 48 %    Mono % 7.7 4 - 15 %    Eos % 1.7 <=8 %    Basophil % 0.6 <=1.9 %    Imm Grans % 0.3 0.0 - 0.5 %    Neut # 3.70 1.8 - 7.7 K/uL    Lymph # 2.02 1 - 4.8 K/uL    Mono # 0.49 0.3 - 1 K/uL    Eos # 0.11 <=0.5 K/uL    Baso # 0.04 <=0.2 K/uL    Imm Grans # 0.02 0.00 - 0.04 K/uL         Assessment    1. Annual physical exam    2. Primary hypertension    3. ELEAZAR (obstructive sleep apnea)    4. Fatigue, unspecified type    5. Low testosterone    6. Encounter for screening for malignant neoplasm of prostate    7. Class 3 severe obesity with serious comorbidity and body mass index (BMI) of 40.0 to 44.9 in adult, unspecified obesity type          Plan    Reji Sethi" was seen today for annual exam.    Diagnoses and all orders for this visit:    Annual physical exam    Primary hypertension  -     triamterene-hydrochlorothiazide 37.5-25 mg (DYAZIDE) 37.5-25 mg per capsule; Take 1 capsule by mouth once daily.  -     Lipid Panel; " Future  -     TSH; Future  -     Comprehensive Metabolic Panel; Future  -     CBC Auto Differential; Future    ELEAZAR (obstructive sleep apnea)  -     tirzepatide, weight loss, (ZEPBOUND) 10 mg/0.5 mL PnIj; Inject 10 mg into the skin every 7 days.    Fatigue, unspecified type    Low testosterone  -     Testosterone,Total; Future    Encounter for screening for malignant neoplasm of prostate  -     PSA, Screening; Future    Class 3 severe obesity with serious comorbidity and body mass index (BMI) of 40.0 to 44.9 in adult, unspecified obesity type  -     tirzepatide, weight loss, (ZEPBOUND) 10 mg/0.5 mL PnIj; Inject 10 mg into the skin every 7 days.  -     Hemoglobin A1C; Future      Physically, everything looks really good.      Recently had blood work 90.  Numbers all came back excellent.  Cholesterol levels have improved significantly over the last year, especially because of weight loss.  Testosterone levels are holding steady at the low end of normal.    Continue the ZepBound 10 mg.  Hopefully the lifetime cap on his insurance we will start on July 1, and not be retroactive.  He will find out at the pharmacy when he goes to fill a prescription.  At some point, we may need to consider compounded tirzepatide or he can switch to Contrave, Qsymia, Adipex.  Unfortunately, with the cap, these may not be covered either.    FOLLOW-UP:  Follow up in about 1 year (around 5/1/2026) for annual exam, with Jemima labs 1 week prior.    I spent a total of 30 minutes face to face and non-face to face on the date of this visit.This includes time preparing to see the patient (eg, review of tests, notes), obtaining and/or reviewing additional history from an independent historian and/or outside medical records, documenting clinical information in the electronic health record, independently interpreting results and/or communicating results to the patient/family/caregiver, or care coordinator.  Visit today included increased complexity  associated with the care of the episodic problem addressed and managing the longitudinal care of the patient due to the serious and/or complex managed problem(s).    Signed by:  Ashish Carrillo MD       [1]   Current Outpatient Medications on File Prior to Visit   Medication Sig Dispense Refill    [DISCONTINUED] tirzepatide, weight loss, (ZEPBOUND) 10 mg/0.5 mL PnIj Inject 10 mg into the skin every 7 days. 2 mL 12    [DISCONTINUED] triamterene-hydrochlorothiazide 37.5-25 mg (DYAZIDE) 37.5-25 mg per capsule Take 1 capsule by mouth once daily 90 capsule 0     No current facility-administered medications on file prior to visit.

## 2025-05-01 NOTE — PATIENT INSTRUCTIONS
Overall, everything looks really good.      All of your recent blood work looks excellent! Keep up the good work.      We can plan to repeat these tests in one year.  Orders placed.  We will just have you stop by the clinic a few days before your next wellness exam to get the blood drawn.      Let us continue with ZepBound 10 mg weekly.  You just started it, so let us give it a few months to see how you do.    If/when your insurance coverage runs out, please come see me.  We will discuss alternative options at that time.      Your blood pressure is doing quite well.  If you have a blood pressure cuff at home, start checking it two or 3 times a week.  Random times.  Be sure to sit and rest for about 3 minutes before taking a reading.  If your home numbers stay below 120/80, you can always experiment with skipping a dose here and there.  If they consistently stay in the normal range, then we can try taking you off of the blood pressure medication.  If you ever see the numbers consistently above 130/90, then I would get back on it.      Continue to eat a healthy diet.  Be careful with portion sizes.  Includes lots of fresh fruits, vegetables, whole grains, lean proteins.  See info below.    Keep hydrated.  Be sure to drink at least 8-10, 8 oz, glasses of water every day.    Stay active.  Try to do some sort of physical activity every day.  Nothing outrageous, just try walking for 10-15 minutes each day.

## 2025-05-08 ENCOUNTER — PATIENT MESSAGE (OUTPATIENT)
Dept: RESEARCH | Facility: HOSPITAL | Age: 40
End: 2025-05-08
Payer: COMMERCIAL

## 2025-07-16 ENCOUNTER — PATIENT MESSAGE (OUTPATIENT)
Dept: PRIMARY CARE CLINIC | Facility: CLINIC | Age: 40
End: 2025-07-16
Payer: COMMERCIAL

## 2025-07-18 ENCOUNTER — OFFICE VISIT (OUTPATIENT)
Dept: PRIMARY CARE CLINIC | Facility: CLINIC | Age: 40
End: 2025-07-18
Payer: COMMERCIAL

## 2025-07-18 VITALS
HEART RATE: 82 BPM | SYSTOLIC BLOOD PRESSURE: 110 MMHG | WEIGHT: 266.75 LBS | OXYGEN SATURATION: 98 % | HEIGHT: 71 IN | BODY MASS INDEX: 37.35 KG/M2 | TEMPERATURE: 97 F | RESPIRATION RATE: 16 BRPM | DIASTOLIC BLOOD PRESSURE: 68 MMHG

## 2025-07-18 DIAGNOSIS — I10 PRIMARY HYPERTENSION: ICD-10-CM

## 2025-07-18 DIAGNOSIS — E66.812 CLASS 2 OBESITY WITH BODY MASS INDEX (BMI) OF 37.0 TO 37.9 IN ADULT, UNSPECIFIED OBESITY TYPE, UNSPECIFIED WHETHER SERIOUS COMORBIDITY PRESENT: Primary | ICD-10-CM

## 2025-07-18 PROCEDURE — 99999 PR PBB SHADOW E&M-EST. PATIENT-LVL III: CPT | Mod: PBBFAC,,, | Performed by: PHYSICIAN ASSISTANT

## 2025-07-18 PROCEDURE — 3074F SYST BP LT 130 MM HG: CPT | Mod: CPTII,S$GLB,, | Performed by: PHYSICIAN ASSISTANT

## 2025-07-18 PROCEDURE — 3078F DIAST BP <80 MM HG: CPT | Mod: CPTII,S$GLB,, | Performed by: PHYSICIAN ASSISTANT

## 2025-07-18 PROCEDURE — 1159F MED LIST DOCD IN RCRD: CPT | Mod: CPTII,S$GLB,, | Performed by: PHYSICIAN ASSISTANT

## 2025-07-18 PROCEDURE — G2211 COMPLEX E/M VISIT ADD ON: HCPCS | Mod: S$GLB,,, | Performed by: PHYSICIAN ASSISTANT

## 2025-07-18 PROCEDURE — 3008F BODY MASS INDEX DOCD: CPT | Mod: CPTII,S$GLB,, | Performed by: PHYSICIAN ASSISTANT

## 2025-07-18 PROCEDURE — 99214 OFFICE O/P EST MOD 30 MIN: CPT | Mod: S$GLB,,, | Performed by: PHYSICIAN ASSISTANT

## 2025-07-18 PROCEDURE — 3044F HG A1C LEVEL LT 7.0%: CPT | Mod: CPTII,S$GLB,, | Performed by: PHYSICIAN ASSISTANT

## 2025-07-18 RX ORDER — PHENTERMINE AND TOPIRAMATE EXTENDED-RELEASE 3.75; 23 MG/1; MG/1
1 CAPSULE ORAL DAILY
Qty: 14 CAPSULE | Refills: 0 | Status: SHIPPED | OUTPATIENT
Start: 2025-07-18 | End: 2025-07-24

## 2025-07-19 NOTE — PROGRESS NOTES
"    [facility]       [address] [place] [address]      Phone: [phone]      Fax: [phone]    Jemima Centeno PA-C                Office Visit  07/19/2025        Subjective      HPI:  Reji Garcia is a 39 y.o. male presents today in clinic for "Follow-up (Medication change)  ."     CHIEF COMPLAINT:  Patient presents to discuss changing weight loss medication due to insurance coverage changes.    HPI:  Patient has been on weight loss medication for approximately 2.5 years, resulting in successful weight loss of 82 lbs. Due to insurance changes, his current medication, Zepbound, will no longer be covered, with about 1 week of supply remaining. He expresses concern about maintaining his weight loss and plans to transition to a combination of phentermine and topiramate (Qsymia) as previously discussed with Dr. Carrillo. Patient works shift work and has a history of severe ADD, formally diagnosed by a psychiatrist many years ago. He took Adderall in high school and college but reported irritability with its use. For the past 6 months, patient has followed a carnivore diet and practices intermittent fasting for 15-18 hours daily, finding it more challenging during night shifts. He consumes large amounts of protein, including grass-fed beef, and reports feeling significantly better.      Follow-up  Pertinent negatives include no abdominal pain, arthralgias, chest pain, headaches, myalgias, nausea, rash, vomiting or weakness.             The following were updated and reviewed by myself in the chart: medications, past medical history, past surgical history, family history, social history, and allergies.     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     Social:  Social History     Socioeconomic History    Marital status: Single   Tobacco Use    Smoking status: Former     Types: Cigarettes    Smokeless tobacco: Current     Types: Chew   Substance and Sexual Activity    Alcohol use: Yes    Drug use: Yes        Allergies:  Review of patient's allergies indicates:   Allergen Reactions    Codeine Other (See Comments)     Unknown, reaction happened as a child        ROS:  Review of Systems   Constitutional:  Negative for activity change, appetite change and unexpected weight change.   Respiratory:  Negative for shortness of breath.    Cardiovascular:  Negative for chest pain.   Gastrointestinal:  Negative for abdominal pain, constipation, diarrhea, nausea and vomiting.   Musculoskeletal:  Negative for arthralgias and myalgias.   Skin:  Negative for rash.   Neurological:  Negative for weakness and headaches.          Objective      /68 (BP Location: Right arm, Patient Position: Sitting)   Pulse 82   Temp 97 °F (36.1 °C) (Tympanic)   Resp 16   Ht 5' 11" (1.803 m)   Wt 121 kg (266 lb 12.1 oz)   SpO2 98%   BMI 37.21 kg/m²   Ht Readings from Last 3 Encounters:   07/18/25 5' 11" (1.803 m)   05/01/25 5' 11" (1.803 m)   11/21/24 5' 11" (1.803 m)     Wt Readings from Last 3 Encounters:   07/18/25 121 kg (266 lb 12.1 oz)   05/01/25 123 kg (271 lb 2.7 oz)   11/21/24 131.1 kg (289 lb 0.4 oz)       PHYSICAL EXAM:  Physical Exam  Constitutional:       Appearance: Normal appearance. He is " "obese.   Pulmonary:      Effort: No respiratory distress.   Neurological:      General: No focal deficit present.      Mental Status: He is alert and oriented to person, place, and time.   Psychiatric:         Mood and Affect: Mood normal.         Thought Content: Thought content normal.            Assessment    1. Class 2 obesity with body mass index (BMI) of 37.0 to 37.9 in adult, unspecified obesity type, unspecified whether serious comorbidity present    2. Primary hypertension      IMPRESSION:  - Transitioning from Zepbound (tirzepatide) to Qsymia (phentermine/topiramate) due to insurance coverage changes.  - Started with low dose Qsymia and titrating up as needed to maintain weight loss  - Considered history of ADHD and previous experience with stimulants.  - Advised gradual transition from Zepbound to Qsymia to minimize side effects and maintain efficacy.    Plan    Reji Sethi" was seen today for follow-up.    Diagnoses and all orders for this visit:    Class 2 obesity with body mass index (BMI) of 37.0 to 37.9 in adult, unspecified obesity type, unspecified whether serious comorbidity present  -     phentermine-topiramate (QSYMIA) 3.75-23 mg CM24; Take 1 tablet by mouth once daily. for 10 days    Primary hypertension       PLAN SUMMARY:   Discontinued Zepbound (tirzepatide) after final dose   Started Qsymia (phentermine/topiramate): Initial dose 3.75 mg/23 mg ER for 14 days   Possible increase to 7.5 mg/46 mg ER after 14 days if needed   Will reassess dosage and send refill based on response   Follow up in 10 days via [employer] message to report Qsymia tolerance    HYPERTENSION:   Monitored the patient's blood pressure which was reported as good during the visit.    OBESITY MANAGEMENT:   Started Qsymia (phentermine/topiramate): Initial dose: 3.75 mg phentermine/23 mg topiramate ER for 14 days, with possible increase to 7.5 mg phentermine/46 mg topiramate ER after 14 days if needed.   Discontinued Zepbound " (tirzepatide) after final dose.  Continue healthy lifestyle     FOLLOW-UP:   Follow up in 10 days via [employer] message to report how tolerating Qsymia.   Will reassess dosage and send refill based on response.      I spent a total of 30 minutes face to face and non-face to face on the date of this visit.This includes time preparing to see the patient (eg, review of tests, notes), obtaining and/or reviewing additional history from an independent historian and/or outside medical records, documenting clinical information in the electronic health record, independently interpreting results and/or communicating results to the patient/family/caregiver, or care coordinator.  Visit today included increased complexity associated with the care of the episodic problem addressed and managing the longitudinal care of the patient due to the serious and/or complex managed problem(s).      Signed by:        Jemima Centeno PA-C      This note was generated with the assistance of ambient listening technology. Verbal consent was obtained by the patient and accompanying visitor(s) for the recording of patient appointment to facilitate this note. I attest to having reviewed and edited the generated note for accuracy, though some syntax or spelling errors may persist. Please contact the author of this note for any clarification.         [1]   Current Outpatient Medications on File Prior to Visit   Medication Sig Dispense Refill    tirzepatide, weight loss, (ZEPBOUND) 10 mg/0.5 mL PnIj Inject 10 mg into the skin every 7 days. 2 mL 12    triamterene-hydrochlorothiazide 37.5-25 mg (DYAZIDE) 37.5-25 mg per capsule Take 1 capsule by mouth once daily. 90 capsule 0     No current facility-administered medications on file prior to visit.

## 2025-07-21 ENCOUNTER — PATIENT MESSAGE (OUTPATIENT)
Dept: PRIMARY CARE CLINIC | Facility: CLINIC | Age: 40
End: 2025-07-21
Payer: COMMERCIAL

## 2025-07-21 DIAGNOSIS — E66.812 CLASS 2 OBESITY WITH BODY MASS INDEX (BMI) OF 37.0 TO 37.9 IN ADULT, UNSPECIFIED OBESITY TYPE, UNSPECIFIED WHETHER SERIOUS COMORBIDITY PRESENT: Primary | ICD-10-CM

## 2025-07-24 RX ORDER — PHENTERMINE HYDROCHLORIDE 37.5 MG/1
37.5 TABLET ORAL
Qty: 30 TABLET | Refills: 0 | Status: SHIPPED | OUTPATIENT
Start: 2025-07-24 | End: 2025-08-23

## 2025-07-24 RX ORDER — TOPIRAMATE 25 MG/1
25 TABLET, FILM COATED ORAL 2 TIMES DAILY
Qty: 60 TABLET | Refills: 11 | Status: SHIPPED | OUTPATIENT
Start: 2025-07-24 | End: 2026-07-24

## 2025-08-13 ENCOUNTER — PATIENT MESSAGE (OUTPATIENT)
Dept: PRIMARY CARE CLINIC | Facility: CLINIC | Age: 40
End: 2025-08-13
Payer: COMMERCIAL

## 2025-08-24 ENCOUNTER — PATIENT MESSAGE (OUTPATIENT)
Dept: PRIMARY CARE CLINIC | Facility: CLINIC | Age: 40
End: 2025-08-24
Payer: COMMERCIAL

## 2025-08-24 DIAGNOSIS — I10 PRIMARY HYPERTENSION: ICD-10-CM

## 2025-08-24 DIAGNOSIS — E66.812 CLASS 2 OBESITY WITH BODY MASS INDEX (BMI) OF 37.0 TO 37.9 IN ADULT, UNSPECIFIED OBESITY TYPE, UNSPECIFIED WHETHER SERIOUS COMORBIDITY PRESENT: ICD-10-CM

## 2025-08-25 ENCOUNTER — TELEPHONE (OUTPATIENT)
Dept: PRIMARY CARE CLINIC | Facility: CLINIC | Age: 40
End: 2025-08-25
Payer: COMMERCIAL

## 2025-08-25 RX ORDER — PHENTERMINE HYDROCHLORIDE 37.5 MG/1
37.5 TABLET ORAL
Qty: 30 TABLET | Refills: 2 | Status: SHIPPED | OUTPATIENT
Start: 2025-08-25

## 2025-08-25 RX ORDER — TRIAMTERENE AND HYDROCHLOROTHIAZIDE 37.5; 25 MG/1; MG/1
1 CAPSULE ORAL DAILY
Qty: 90 CAPSULE | Refills: 3 | Status: SHIPPED | OUTPATIENT
Start: 2025-08-25

## 2025-08-25 RX ORDER — TOPIRAMATE 25 MG/1
25 TABLET, FILM COATED ORAL 2 TIMES DAILY
Qty: 180 TABLET | Refills: 3 | Status: SHIPPED | OUTPATIENT
Start: 2025-08-25 | End: 2026-08-25